# Patient Record
Sex: MALE | Race: WHITE | NOT HISPANIC OR LATINO | Employment: OTHER | URBAN - METROPOLITAN AREA
[De-identification: names, ages, dates, MRNs, and addresses within clinical notes are randomized per-mention and may not be internally consistent; named-entity substitution may affect disease eponyms.]

---

## 2017-02-16 PROBLEM — N21.0 BLADDER STONE: Status: ACTIVE | Noted: 2017-02-16

## 2017-02-17 PROBLEM — R31.0 GROSS HEMATURIA: Status: ACTIVE | Noted: 2017-02-17

## 2019-02-09 ENCOUNTER — HOSPITAL ENCOUNTER (EMERGENCY)
Facility: HOSPITAL | Age: 84
Discharge: HOME | End: 2019-02-09
Attending: SURGERY
Payer: MEDICARE

## 2019-02-09 VITALS
OXYGEN SATURATION: 100 % | BODY MASS INDEX: 31.32 KG/M2 | HEART RATE: 95 BPM | DIASTOLIC BLOOD PRESSURE: 70 MMHG | RESPIRATION RATE: 17 BRPM | TEMPERATURE: 98 F | SYSTOLIC BLOOD PRESSURE: 130 MMHG | WEIGHT: 200 LBS

## 2019-02-09 DIAGNOSIS — R06.02 SOB (SHORTNESS OF BREATH): ICD-10-CM

## 2019-02-09 DIAGNOSIS — J10.1 INFLUENZA A: Primary | ICD-10-CM

## 2019-02-09 LAB
ALBUMIN SERPL BCP-MCNC: 3.6 G/DL
ALP SERPL-CCNC: 83 U/L
ALT SERPL W/O P-5'-P-CCNC: 13 U/L
ANION GAP SERPL CALC-SCNC: 8 MMOL/L
APTT BLDCRRT: 34.2 SEC
AST SERPL-CCNC: 21 U/L
BASOPHILS # BLD AUTO: 0.01 K/UL
BASOPHILS NFR BLD: 0.2 %
BILIRUB SERPL-MCNC: 0.4 MG/DL
BNP SERPL-MCNC: 36 PG/ML
BUN SERPL-MCNC: 32 MG/DL
CALCIUM SERPL-MCNC: 9.1 MG/DL
CHLORIDE SERPL-SCNC: 105 MMOL/L
CK MB SERPL-MCNC: 3.2 NG/ML
CK MB SERPL-RTO: 1.8 %
CK SERPL-CCNC: 175 U/L
CK SERPL-CCNC: 175 U/L
CO2 SERPL-SCNC: 26 MMOL/L
CREAT SERPL-MCNC: 1.7 MG/DL
D DIMER PPP IA.FEU-MCNC: 4.22 MG/L FEU
DEPRECATED S PYO AG THROAT QL EIA: NEGATIVE
DIFFERENTIAL METHOD: ABNORMAL
EOSINOPHIL # BLD AUTO: 0.1 K/UL
EOSINOPHIL NFR BLD: 2.4 %
ERYTHROCYTE [DISTWIDTH] IN BLOOD BY AUTOMATED COUNT: 14.9 %
EST. GFR  (AFRICAN AMERICAN): 41 ML/MIN/1.73 M^2
EST. GFR  (NON AFRICAN AMERICAN): 35 ML/MIN/1.73 M^2
GLUCOSE SERPL-MCNC: 116 MG/DL
HCT VFR BLD AUTO: 43.9 %
HGB BLD-MCNC: 14.4 G/DL
INFLUENZA A, MOLECULAR: POSITIVE
INFLUENZA B, MOLECULAR: NEGATIVE
INR PPP: 1
LYMPHOCYTES # BLD AUTO: 0.6 K/UL
LYMPHOCYTES NFR BLD: 11.3 %
MCH RBC QN AUTO: 31.4 PG
MCHC RBC AUTO-ENTMCNC: 32.8 G/DL
MCV RBC AUTO: 96 FL
MONOCYTES # BLD AUTO: 0.5 K/UL
MONOCYTES NFR BLD: 8.9 %
NEUTROPHILS # BLD AUTO: 4.3 K/UL
NEUTROPHILS NFR BLD: 77.2 %
PLATELET # BLD AUTO: 177 K/UL
PMV BLD AUTO: 9.5 FL
POTASSIUM SERPL-SCNC: 4.3 MMOL/L
PROT SERPL-MCNC: 7.1 G/DL
PROTHROMBIN TIME: 10.9 SEC
RBC # BLD AUTO: 4.59 M/UL
SODIUM SERPL-SCNC: 139 MMOL/L
SPECIMEN SOURCE: ABNORMAL
TROPONIN I SERPL DL<=0.01 NG/ML-MCNC: 0.02 NG/ML
WBC # BLD AUTO: 5.5 K/UL

## 2019-02-09 PROCEDURE — 84484 ASSAY OF TROPONIN QUANT: CPT

## 2019-02-09 PROCEDURE — 93005 ELECTROCARDIOGRAM TRACING: CPT

## 2019-02-09 PROCEDURE — 87880 STREP A ASSAY W/OPTIC: CPT

## 2019-02-09 PROCEDURE — 99285 EMERGENCY DEPT VISIT HI MDM: CPT

## 2019-02-09 PROCEDURE — 87081 CULTURE SCREEN ONLY: CPT

## 2019-02-09 PROCEDURE — 85025 COMPLETE CBC W/AUTO DIFF WBC: CPT

## 2019-02-09 PROCEDURE — 85730 THROMBOPLASTIN TIME PARTIAL: CPT

## 2019-02-09 PROCEDURE — 36415 COLL VENOUS BLD VENIPUNCTURE: CPT

## 2019-02-09 PROCEDURE — 93010 ELECTROCARDIOGRAM REPORT: CPT | Mod: ,,, | Performed by: INTERNAL MEDICINE

## 2019-02-09 PROCEDURE — 82550 ASSAY OF CK (CPK): CPT

## 2019-02-09 PROCEDURE — 85379 FIBRIN DEGRADATION QUANT: CPT

## 2019-02-09 PROCEDURE — 87502 INFLUENZA DNA AMP PROBE: CPT

## 2019-02-09 PROCEDURE — 25000003 PHARM REV CODE 250: Performed by: SURGERY

## 2019-02-09 PROCEDURE — 82553 CREATINE MB FRACTION: CPT

## 2019-02-09 PROCEDURE — 80053 COMPREHEN METABOLIC PANEL: CPT

## 2019-02-09 PROCEDURE — 93010 EKG 12-LEAD: ICD-10-PCS | Mod: ,,, | Performed by: INTERNAL MEDICINE

## 2019-02-09 PROCEDURE — 83880 ASSAY OF NATRIURETIC PEPTIDE: CPT

## 2019-02-09 PROCEDURE — 85610 PROTHROMBIN TIME: CPT

## 2019-02-09 RX ORDER — OSELTAMIVIR PHOSPHATE 75 MG/1
75 CAPSULE ORAL
Status: COMPLETED | OUTPATIENT
Start: 2019-02-09 | End: 2019-02-09

## 2019-02-09 RX ORDER — ALBUTEROL SULFATE 2 MG/5ML
2 SYRUP ORAL 3 TIMES DAILY PRN
Qty: 100 ML | Refills: 0 | Status: ON HOLD | OUTPATIENT
Start: 2019-02-09 | End: 2020-08-28 | Stop reason: SDUPTHER

## 2019-02-09 RX ORDER — OSELTAMIVIR PHOSPHATE 75 MG/1
75 CAPSULE ORAL 2 TIMES DAILY
Qty: 10 CAPSULE | Refills: 0 | Status: SHIPPED | OUTPATIENT
Start: 2019-02-09 | End: 2019-02-14

## 2019-02-09 RX ADMIN — OSELTAMIVIR PHOSPHATE 75 MG: 75 CAPSULE ORAL at 07:02

## 2019-02-10 NOTE — ED PROVIDER NOTES
[facility]                                                 Chief Complaint  88 y.o. male with Cough    History of Present Illness  Pinky Rivera presents to the emergency room with nasal congestion today  Patient on exam has clear nasal drainage with nasal mucosa erythema noted now  Patient's wife was diagnosed with influenza a, patient has no fever on ER triage  Patient on exam has clear lung sounds in all fields, no active wheezing noted now  No nausea vomiting or diarrhea, patient is not toxic or dehydrated on ER evaluation    The history is provided by the patient   device was not used during this ER visit    Past Medical History   -- Bladder stone    -- Blind right eye    -- BPH (benign prostatic hypertrophy)    -- Carotid artery disease    -- CHF (congestive heart failure)    -- [place] (hard of hearing)    -- Hyperlipidemia    -- Hypertension    -- Incomplete bundle branch block    -- Osteoarthritis    -- Pulmonary heart disease, chronic      Surgeries:  Appendectomy, cholecystectomy, colonoscopy, cystoscopy, lithotripsy, tonsillectomy  No Known Allergies     Review of Systems and Physical Exam      Review of Systems  -- Constitution - fever, denies fatigue, no weakness, no chills  -- Eyes - no tearing or redness, no visual disturbance  -- Ear, Nose - sneezing, nasal congestion and clear discharge   -- Mouth,Throat - no sore throat, no toothache, normal voice, normal swallowing  -- Respiratory - cough and congestion, no shortness of breath, no QUIROZ  -- Cardiovascular - denies chest pain, no palpitations, denies claudication  -- Gastrointestinal - denies abdominal pain, nausea, vomiting, or diarrhea  -- Genitourinary - no dysuria, no hematuria, no flank pain, no bladder pain  -- Musculoskeletal - denies back pain, negative for trauma or injury  -- Neurological - no headache, denies weakness or seizure; no LOC  -- Skin - denies pallor, rash, or changes in skin. no hives  or welts noted    Vital Signs  His oral temperature is 98.4 °F (36.9 °C).   His blood pressure is 132/70 and his pulse is 98.   His respiration is 20 and oxygen saturation is 94 (abnormal)%.     Physical Exam  -- Nursing note and vitals reviewed  -- Constitutional: Appears well-developed and well-nourished  -- Head: Atraumatic. Normocephalic. No obvious abnormality  -- Eyes: Pupils are equal and reactive to light. Normal conjunctiva and lids  -- Nose: nasal mucosa erythema and edema; clear nasal discharge noted   -- Throat: Mucous membranes moist, pharynx normal, normal tonsils. No lesions   -- Ears: External ears and TM normal bilaterally. Normal hearing and no drainage  -- Neck: Normal range of motion. Neck supple. No masses, trachea midline  -- Cardiac: Normal rate, regular rhythm and normal heart sounds  -- Pulmonary: Normal respiratory effort, breath sounds clear to auscultation  -- Abdominal: Soft, no tenderness. Normal bowel sounds. Normal liver edge  -- Musculoskeletal: Normal range of motion, no effusions. Joints stable   -- Neurological: No focal deficits. Showed good interaction with staff  -- Skin: Warm and dry. No evidence of rash or cellulitis    Emergency Room Course      Lab Results     K 4.3      CO2 26   BUN 32 (H)   CREATININE 1.7 (H)    (H)   ALKPHOS 83   AST 21   ALT 13   BILITOT 0.4   ALBUMIN 3.6   PROT 7.1   WBC 5.50   HGB 14.4   HCT 43.9            CPKMB 3.2   TROPONINI 0.018   INR 1.0   BNP 36   DDIMER 4.22 (H)     EKG  -- The EKG findings today were without concerning findings from baseline    Radiology  -- Chest x-ray showed no infiltrate and showed no acute pathology  -- The US of the lower extremity performed in the ER today was negative for DVT      Medications Given  oseltamivir capsule 75 mg (75 mg Oral Given 2/9/19 1930)     Diagnosis  -- The primary encounter diagnosis was Influenza A.   -- A diagnosis of SOB (shortness of breath) was also  pertinent to this visit.    Disposition and Plan  -- Disposition: home  -- Condition: stable  -- Follow-up: Patient to follow up with Luly Ennis NP in 1-2 days.  -- I advised the patient that we have found no life threatening condition today  -- At this time, I believe the patient is clinically stable for discharge.   -- The patient acknowledges that close follow up with a MD is required   -- Patient agrees to comply with all instruction and direction given in the ER    This note is dictated on M*Modal word recognition program.  There are word recognition mistakes that are occasionally missed on review.          Burke Jimenez MD  02/09/19 [id number]

## 2019-02-12 LAB — BACTERIA THROAT CULT: NORMAL

## 2020-08-21 ENCOUNTER — HOSPITAL ENCOUNTER (EMERGENCY)
Facility: HOSPITAL | Age: 85
Discharge: ANOTHER HOSPITAL | End: 2020-08-22
Attending: SURGERY
Payer: MEDICARE

## 2020-08-21 DIAGNOSIS — J18.9 PNEUMONIA DUE TO INFECTIOUS ORGANISM, UNSPECIFIED LATERALITY, UNSPECIFIED PART OF LUNG: ICD-10-CM

## 2020-08-21 DIAGNOSIS — U07.1 COVID-19 VIRUS INFECTION: Primary | ICD-10-CM

## 2020-08-21 DIAGNOSIS — J96.90 RESPIRATORY FAILURE, UNSPECIFIED CHRONICITY, UNSPECIFIED WHETHER WITH HYPOXIA OR HYPERCAPNIA: ICD-10-CM

## 2020-08-21 DIAGNOSIS — U07.1 COVID-19 VIRUS DETECTED: ICD-10-CM

## 2020-08-21 DIAGNOSIS — R05.9 COUGH: ICD-10-CM

## 2020-08-21 DIAGNOSIS — R09.02 HYPOXIA: ICD-10-CM

## 2020-08-21 DIAGNOSIS — R06.00 DYSPNEA, UNSPECIFIED TYPE: ICD-10-CM

## 2020-08-21 LAB
ALBUMIN SERPL BCP-MCNC: 3.3 G/DL (ref 3.5–5.2)
ALLENS TEST: ABNORMAL
ALP SERPL-CCNC: 53 U/L (ref 55–135)
ALT SERPL W/O P-5'-P-CCNC: 13 U/L (ref 10–44)
AMORPH CRY URNS QL MICRO: ABNORMAL
ANION GAP SERPL CALC-SCNC: 9 MMOL/L (ref 8–16)
APTT BLDCRRT: 33.2 SEC (ref 21–32)
AST SERPL-CCNC: 30 U/L (ref 10–40)
BACTERIA #/AREA URNS HPF: ABNORMAL /HPF
BASOPHILS # BLD AUTO: 0 K/UL (ref 0–0.2)
BASOPHILS NFR BLD: 0 % (ref 0–1.9)
BILIRUB SERPL-MCNC: 0.5 MG/DL (ref 0.1–1)
BILIRUB UR QL STRIP: NEGATIVE
BNP SERPL-MCNC: 46 PG/ML (ref 0–99)
BUN SERPL-MCNC: 28 MG/DL (ref 8–23)
CALCIUM SERPL-MCNC: 8.8 MG/DL (ref 8.7–10.5)
CHLORIDE SERPL-SCNC: 104 MMOL/L (ref 95–110)
CK MB SERPL-MCNC: 3.1 NG/ML (ref 0.1–6.5)
CK MB SERPL-RTO: 0.9 % (ref 0–5)
CK SERPL-CCNC: 350 U/L (ref 20–200)
CK SERPL-CCNC: 350 U/L (ref 20–200)
CLARITY UR: ABNORMAL
CO2 SERPL-SCNC: 28 MMOL/L (ref 23–29)
COLOR UR: YELLOW
CREAT SERPL-MCNC: 1.5 MG/DL (ref 0.5–1.4)
D DIMER PPP IA.FEU-MCNC: 4.82 MG/L FEU
DELSYS: ABNORMAL
DIFFERENTIAL METHOD: ABNORMAL
EOSINOPHIL # BLD AUTO: 0 K/UL (ref 0–0.5)
EOSINOPHIL NFR BLD: 0.2 % (ref 0–8)
ERYTHROCYTE [DISTWIDTH] IN BLOOD BY AUTOMATED COUNT: 14.2 % (ref 11.5–14.5)
ERYTHROCYTE [SEDIMENTATION RATE] IN BLOOD BY WESTERGREN METHOD: 64 MM/HR (ref 0–10)
EST. GFR  (AFRICAN AMERICAN): 47 ML/MIN/1.73 M^2
EST. GFR  (NON AFRICAN AMERICAN): 40 ML/MIN/1.73 M^2
GLUCOSE SERPL-MCNC: 105 MG/DL (ref 70–110)
GLUCOSE UR QL STRIP: NEGATIVE
GROUP A STREP, MOLECULAR: NEGATIVE
HCO3 UR-SCNC: 24.7 MMOL/L (ref 22–26)
HCT VFR BLD AUTO: 43.6 % (ref 40–54)
HGB BLD-MCNC: 14 G/DL (ref 14–18)
HGB UR QL STRIP: ABNORMAL
HYALINE CASTS #/AREA URNS LPF: 0 /LPF
IMM GRANULOCYTES # BLD AUTO: 0.01 K/UL (ref 0–0.04)
IMM GRANULOCYTES NFR BLD AUTO: 0.2 % (ref 0–0.5)
INFLUENZA A, MOLECULAR: NEGATIVE
INFLUENZA B, MOLECULAR: NEGATIVE
INR PPP: 1 (ref 0.8–1.2)
KETONES UR QL STRIP: NEGATIVE
LACTATE SERPL-SCNC: 1.2 MMOL/L (ref 0.5–2.2)
LDH SERPL L TO P-CCNC: 277 U/L (ref 110–260)
LEUKOCYTE ESTERASE UR QL STRIP: NEGATIVE
LYMPHOCYTES # BLD AUTO: 0.9 K/UL (ref 1–4.8)
LYMPHOCYTES NFR BLD: 18.7 % (ref 18–48)
MCH RBC QN AUTO: 31.4 PG (ref 27–31)
MCHC RBC AUTO-ENTMCNC: 32.1 G/DL (ref 32–36)
MCV RBC AUTO: 98 FL (ref 82–98)
MICROSCOPIC COMMENT: ABNORMAL
MONOCYTES # BLD AUTO: 0.4 K/UL (ref 0.3–1)
MONOCYTES NFR BLD: 8.6 % (ref 4–15)
NEUTROPHILS # BLD AUTO: 3.6 K/UL (ref 1.8–7.7)
NEUTROPHILS NFR BLD: 72.3 % (ref 38–73)
NITRITE UR QL STRIP: NEGATIVE
NRBC BLD-RTO: 0 /100 WBC
PCO2 BLDA: 39 MMHG (ref 35–45)
PH SMN: 7.41 [PH] (ref 7.35–7.45)
PH UR STRIP: 5 [PH] (ref 5–8)
PLATELET # BLD AUTO: 189 K/UL (ref 150–350)
PMV BLD AUTO: 9.3 FL (ref 9.2–12.9)
PO2 BLDA: 46 MMHG (ref 75–100)
POC BE: 0.1 MMOL/L (ref -2–2)
POC COHB: 1.6 % (ref 0–3)
POC METHB: 0.7 % (ref 0–1.5)
POC O2HB ARTERIAL: 84.7 % (ref 94–100)
POC SATURATED O2: 86.7 % (ref 90–100)
POC TCO2: 25.9 MMOL/L
POC THB: 12.6 G/DL (ref 12–18)
POTASSIUM SERPL-SCNC: 4.9 MMOL/L (ref 3.5–5.1)
PROCALCITONIN SERPL IA-MCNC: 0.17 NG/ML
PROT SERPL-MCNC: 7.3 G/DL (ref 6–8.4)
PROT UR QL STRIP: ABNORMAL
PROTHROMBIN TIME: 10.7 SEC (ref 9–12.5)
RBC # BLD AUTO: 4.46 M/UL (ref 4.6–6.2)
RBC #/AREA URNS HPF: 2 /HPF (ref 0–4)
SARS-COV-2 RDRP RESP QL NAA+PROBE: POSITIVE
SITE: ABNORMAL
SODIUM SERPL-SCNC: 141 MMOL/L (ref 136–145)
SP GR UR STRIP: 1.02 (ref 1–1.03)
SPECIMEN SOURCE: NORMAL
TROPONIN I SERPL DL<=0.01 NG/ML-MCNC: 0.09 NG/ML (ref 0–0.03)
URN SPEC COLLECT METH UR: ABNORMAL
UROBILINOGEN UR STRIP-ACNC: NEGATIVE EU/DL
WBC # BLD AUTO: 4.98 K/UL (ref 3.9–12.7)
WBC #/AREA URNS HPF: 1 /HPF (ref 0–5)

## 2020-08-21 PROCEDURE — 85610 PROTHROMBIN TIME: CPT

## 2020-08-21 PROCEDURE — 82553 CREATINE MB FRACTION: CPT

## 2020-08-21 PROCEDURE — 85730 THROMBOPLASTIN TIME PARTIAL: CPT

## 2020-08-21 PROCEDURE — 81000 URINALYSIS NONAUTO W/SCOPE: CPT

## 2020-08-21 PROCEDURE — 84145 PROCALCITONIN (PCT): CPT

## 2020-08-21 PROCEDURE — 87651 STREP A DNA AMP PROBE: CPT

## 2020-08-21 PROCEDURE — 83880 ASSAY OF NATRIURETIC PEPTIDE: CPT

## 2020-08-21 PROCEDURE — 87040 BLOOD CULTURE FOR BACTERIA: CPT | Mod: 59

## 2020-08-21 PROCEDURE — 85025 COMPLETE CBC W/AUTO DIFF WBC: CPT

## 2020-08-21 PROCEDURE — U0002 COVID-19 LAB TEST NON-CDC: HCPCS

## 2020-08-21 PROCEDURE — 25000003 PHARM REV CODE 250: Performed by: SURGERY

## 2020-08-21 PROCEDURE — 96365 THER/PROPH/DIAG IV INF INIT: CPT

## 2020-08-21 PROCEDURE — 87502 INFLUENZA DNA AMP PROBE: CPT

## 2020-08-21 PROCEDURE — 82550 ASSAY OF CK (CPK): CPT

## 2020-08-21 PROCEDURE — 85651 RBC SED RATE NONAUTOMATED: CPT

## 2020-08-21 PROCEDURE — 86140 C-REACTIVE PROTEIN: CPT

## 2020-08-21 PROCEDURE — 82803 BLOOD GASES ANY COMBINATION: CPT | Performed by: SURGERY

## 2020-08-21 PROCEDURE — 96367 TX/PROPH/DG ADDL SEQ IV INF: CPT

## 2020-08-21 PROCEDURE — 99285 EMERGENCY DEPT VISIT HI MDM: CPT | Mod: 25

## 2020-08-21 PROCEDURE — 63600175 PHARM REV CODE 636 W HCPCS: Performed by: SURGERY

## 2020-08-21 PROCEDURE — 93005 ELECTROCARDIOGRAM TRACING: CPT

## 2020-08-21 PROCEDURE — 80053 COMPREHEN METABOLIC PANEL: CPT

## 2020-08-21 PROCEDURE — 83615 LACTATE (LD) (LDH) ENZYME: CPT

## 2020-08-21 PROCEDURE — 36600 WITHDRAWAL OF ARTERIAL BLOOD: CPT

## 2020-08-21 PROCEDURE — 85379 FIBRIN DEGRADATION QUANT: CPT

## 2020-08-21 PROCEDURE — 25500020 PHARM REV CODE 255: Performed by: SURGERY

## 2020-08-21 PROCEDURE — 82728 ASSAY OF FERRITIN: CPT

## 2020-08-21 PROCEDURE — 93010 ELECTROCARDIOGRAM REPORT: CPT | Mod: ,,, | Performed by: INTERNAL MEDICINE

## 2020-08-21 PROCEDURE — 83605 ASSAY OF LACTIC ACID: CPT

## 2020-08-21 PROCEDURE — 96361 HYDRATE IV INFUSION ADD-ON: CPT

## 2020-08-21 PROCEDURE — 84484 ASSAY OF TROPONIN QUANT: CPT

## 2020-08-21 PROCEDURE — 96375 TX/PRO/DX INJ NEW DRUG ADDON: CPT | Mod: 59

## 2020-08-21 PROCEDURE — 36415 COLL VENOUS BLD VENIPUNCTURE: CPT

## 2020-08-21 PROCEDURE — 93010 EKG 12-LEAD: ICD-10-PCS | Mod: ,,, | Performed by: INTERNAL MEDICINE

## 2020-08-21 RX ORDER — DEXAMETHASONE SODIUM PHOSPHATE 4 MG/ML
6 INJECTION, SOLUTION INTRA-ARTICULAR; INTRALESIONAL; INTRAMUSCULAR; INTRAVENOUS; SOFT TISSUE
Status: COMPLETED | OUTPATIENT
Start: 2020-08-21 | End: 2020-08-21

## 2020-08-21 RX ADMIN — SODIUM CHLORIDE 500 ML: 0.9 INJECTION, SOLUTION INTRAVENOUS at 09:08

## 2020-08-21 RX ADMIN — AZITHROMYCIN 500 MG: 500 INJECTION, POWDER, LYOPHILIZED, FOR SOLUTION INTRAVENOUS at 10:08

## 2020-08-21 RX ADMIN — IOHEXOL 100 ML: 350 INJECTION, SOLUTION INTRAVENOUS at 10:08

## 2020-08-21 RX ADMIN — CEFTRIAXONE 1 G: 1 INJECTION, SOLUTION INTRAVENOUS at 11:08

## 2020-08-21 RX ADMIN — DEXAMETHASONE SODIUM PHOSPHATE 6 MG: 4 INJECTION, SOLUTION INTRA-ARTICULAR; INTRALESIONAL; INTRAMUSCULAR; INTRAVENOUS; SOFT TISSUE at 11:08

## 2020-08-21 NOTE — ED TRIAGE NOTES
Pt reports he has cough for a few days now. Is concerned for fluid build up on lungs or covid infection.

## 2020-08-22 ENCOUNTER — HOSPITAL ENCOUNTER (INPATIENT)
Facility: HOSPITAL | Age: 85
LOS: 6 days | Discharge: HOME HEALTH | DRG: 177 | End: 2020-08-28
Attending: HOSPITALIST | Admitting: HOSPITALIST
Payer: MEDICARE

## 2020-08-22 VITALS
HEART RATE: 88 BPM | SYSTOLIC BLOOD PRESSURE: 144 MMHG | DIASTOLIC BLOOD PRESSURE: 65 MMHG | TEMPERATURE: 100 F | OXYGEN SATURATION: 96 % | RESPIRATION RATE: 35 BRPM

## 2020-08-22 DIAGNOSIS — U07.1 COVID-19: ICD-10-CM

## 2020-08-22 DIAGNOSIS — U07.1 PNEUMONIA DUE TO COVID-19 VIRUS: ICD-10-CM

## 2020-08-22 DIAGNOSIS — R00.1 BRADYCARDIA: ICD-10-CM

## 2020-08-22 DIAGNOSIS — J12.82 PNEUMONIA DUE TO COVID-19 VIRUS: ICD-10-CM

## 2020-08-22 DIAGNOSIS — J96.01 ACUTE HYPOXEMIC RESPIRATORY FAILURE: ICD-10-CM

## 2020-08-22 PROBLEM — R79.89 ELEVATED TROPONIN: Status: ACTIVE | Noted: 2020-08-22

## 2020-08-22 PROBLEM — H54.40 BLINDNESS OF RIGHT EYE: Chronic | Status: ACTIVE | Noted: 2020-08-22

## 2020-08-22 PROBLEM — N40.0 BPH (BENIGN PROSTATIC HYPERPLASIA): Status: ACTIVE | Noted: 2020-08-22

## 2020-08-22 PROBLEM — N18.30 CKD (CHRONIC KIDNEY DISEASE), STAGE III: Status: ACTIVE | Noted: 2020-08-22

## 2020-08-22 LAB
25(OH)D3+25(OH)D2 SERPL-MCNC: 33 NG/ML (ref 30–96)
ALBUMIN SERPL BCP-MCNC: 2.8 G/DL (ref 3.5–5.2)
ALP SERPL-CCNC: 50 U/L (ref 55–135)
ALT SERPL W/O P-5'-P-CCNC: 13 U/L (ref 10–44)
ANION GAP SERPL CALC-SCNC: 9 MMOL/L (ref 8–16)
AST SERPL-CCNC: 30 U/L (ref 10–40)
BASOPHILS # BLD AUTO: 0 K/UL (ref 0–0.2)
BASOPHILS NFR BLD: 0 % (ref 0–1.9)
BILIRUB SERPL-MCNC: 0.4 MG/DL (ref 0.1–1)
BUN SERPL-MCNC: 23 MG/DL (ref 8–23)
CALCIUM SERPL-MCNC: 8.4 MG/DL (ref 8.7–10.5)
CHLORIDE SERPL-SCNC: 106 MMOL/L (ref 95–110)
CK SERPL-CCNC: 273 U/L (ref 20–200)
CO2 SERPL-SCNC: 24 MMOL/L (ref 23–29)
CREAT SERPL-MCNC: 1.2 MG/DL (ref 0.5–1.4)
CRP SERPL-MCNC: 83.5 MG/L (ref 0–8.2)
DIFFERENTIAL METHOD: ABNORMAL
EOSINOPHIL # BLD AUTO: 0 K/UL (ref 0–0.5)
EOSINOPHIL NFR BLD: 0 % (ref 0–8)
ERYTHROCYTE [DISTWIDTH] IN BLOOD BY AUTOMATED COUNT: 14.1 % (ref 11.5–14.5)
ERYTHROCYTE [SEDIMENTATION RATE] IN BLOOD BY WESTERGREN METHOD: 82 MM/HR (ref 0–23)
EST. GFR  (AFRICAN AMERICAN): >60 ML/MIN/1.73 M^2
EST. GFR  (NON AFRICAN AMERICAN): 52.9 ML/MIN/1.73 M^2
FERRITIN SERPL-MCNC: 455 NG/ML (ref 20–300)
GLUCOSE SERPL-MCNC: 136 MG/DL (ref 70–110)
HCT VFR BLD AUTO: 40.7 % (ref 40–54)
HGB BLD-MCNC: 12.9 G/DL (ref 14–18)
IMM GRANULOCYTES # BLD AUTO: 0.01 K/UL (ref 0–0.04)
IMM GRANULOCYTES NFR BLD AUTO: 0.2 % (ref 0–0.5)
LYMPHOCYTES # BLD AUTO: 0.4 K/UL (ref 1–4.8)
LYMPHOCYTES NFR BLD: 9.7 % (ref 18–48)
MAGNESIUM SERPL-MCNC: 2 MG/DL (ref 1.6–2.6)
MCH RBC QN AUTO: 31.3 PG (ref 27–31)
MCHC RBC AUTO-ENTMCNC: 31.7 G/DL (ref 32–36)
MCV RBC AUTO: 99 FL (ref 82–98)
MONOCYTES # BLD AUTO: 0.2 K/UL (ref 0.3–1)
MONOCYTES NFR BLD: 4.6 % (ref 4–15)
NEUTROPHILS # BLD AUTO: 3.5 K/UL (ref 1.8–7.7)
NEUTROPHILS NFR BLD: 85.5 % (ref 38–73)
NRBC BLD-RTO: 0 /100 WBC
PHOSPHATE SERPL-MCNC: 3.4 MG/DL (ref 2.7–4.5)
PLATELET # BLD AUTO: 169 K/UL (ref 150–350)
PMV BLD AUTO: 9.8 FL (ref 9.2–12.9)
POTASSIUM SERPL-SCNC: 4.5 MMOL/L (ref 3.5–5.1)
PROCALCITONIN SERPL IA-MCNC: 0.14 NG/ML
PROT SERPL-MCNC: 6.5 G/DL (ref 6–8.4)
RBC # BLD AUTO: 4.12 M/UL (ref 4.6–6.2)
SODIUM SERPL-SCNC: 139 MMOL/L (ref 136–145)
TROPONIN I SERPL DL<=0.01 NG/ML-MCNC: 0.07 NG/ML (ref 0–0.03)
WBC # BLD AUTO: 4.13 K/UL (ref 3.9–12.7)

## 2020-08-22 PROCEDURE — 99900035 HC TECH TIME PER 15 MIN (STAT)

## 2020-08-22 PROCEDURE — 25000003 PHARM REV CODE 250: Performed by: HOSPITALIST

## 2020-08-22 PROCEDURE — 82306 VITAMIN D 25 HYDROXY: CPT

## 2020-08-22 PROCEDURE — 63600175 PHARM REV CODE 636 W HCPCS: Performed by: HOSPITALIST

## 2020-08-22 PROCEDURE — 99223 PR INITIAL HOSPITAL CARE,LEVL III: ICD-10-PCS | Mod: ,,, | Performed by: HOSPITALIST

## 2020-08-22 PROCEDURE — 94640 AIRWAY INHALATION TREATMENT: CPT

## 2020-08-22 PROCEDURE — 85652 RBC SED RATE AUTOMATED: CPT

## 2020-08-22 PROCEDURE — 80053 COMPREHEN METABOLIC PANEL: CPT

## 2020-08-22 PROCEDURE — 84100 ASSAY OF PHOSPHORUS: CPT

## 2020-08-22 PROCEDURE — A4216 STERILE WATER/SALINE, 10 ML: HCPCS | Performed by: HOSPITALIST

## 2020-08-22 PROCEDURE — 63700000 PHARM REV CODE 250 ALT 637 W/O HCPCS: Performed by: HOSPITALIST

## 2020-08-22 PROCEDURE — 11000001 HC ACUTE MED/SURG PRIVATE ROOM

## 2020-08-22 PROCEDURE — 84484 ASSAY OF TROPONIN QUANT: CPT

## 2020-08-22 PROCEDURE — 27000221 HC OXYGEN, UP TO 24 HOURS

## 2020-08-22 PROCEDURE — 82550 ASSAY OF CK (CPK): CPT

## 2020-08-22 PROCEDURE — 94761 N-INVAS EAR/PLS OXIMETRY MLT: CPT

## 2020-08-22 PROCEDURE — 36415 COLL VENOUS BLD VENIPUNCTURE: CPT

## 2020-08-22 PROCEDURE — 85025 COMPLETE CBC W/AUTO DIFF WBC: CPT

## 2020-08-22 PROCEDURE — 99223 1ST HOSP IP/OBS HIGH 75: CPT | Mod: ,,, | Performed by: HOSPITALIST

## 2020-08-22 PROCEDURE — 84145 PROCALCITONIN (PCT): CPT

## 2020-08-22 PROCEDURE — 83735 ASSAY OF MAGNESIUM: CPT

## 2020-08-22 PROCEDURE — 25000242 PHARM REV CODE 250 ALT 637 W/ HCPCS: Performed by: HOSPITALIST

## 2020-08-22 RX ORDER — IBUPROFEN 200 MG
16 TABLET ORAL
Status: DISCONTINUED | OUTPATIENT
Start: 2020-08-22 | End: 2020-08-28 | Stop reason: HOSPADM

## 2020-08-22 RX ORDER — IPRATROPIUM BROMIDE AND ALBUTEROL SULFATE 2.5; .5 MG/3ML; MG/3ML
3 SOLUTION RESPIRATORY (INHALATION) EVERY 4 HOURS PRN
Status: DISCONTINUED | OUTPATIENT
Start: 2020-08-22 | End: 2020-08-28 | Stop reason: HOSPADM

## 2020-08-22 RX ORDER — ONDANSETRON 2 MG/ML
4 INJECTION INTRAMUSCULAR; INTRAVENOUS EVERY 6 HOURS PRN
Status: DISCONTINUED | OUTPATIENT
Start: 2020-08-22 | End: 2020-08-28 | Stop reason: HOSPADM

## 2020-08-22 RX ORDER — GLUCAGON 1 MG
1 KIT INJECTION
Status: DISCONTINUED | OUTPATIENT
Start: 2020-08-22 | End: 2020-08-28 | Stop reason: HOSPADM

## 2020-08-22 RX ORDER — ACETAMINOPHEN 325 MG/1
650 TABLET ORAL EVERY 4 HOURS PRN
Status: DISCONTINUED | OUTPATIENT
Start: 2020-08-22 | End: 2020-08-28 | Stop reason: HOSPADM

## 2020-08-22 RX ORDER — LOSARTAN POTASSIUM 25 MG/1
50 TABLET ORAL EVERY MORNING
Status: DISCONTINUED | OUTPATIENT
Start: 2020-08-22 | End: 2020-08-26

## 2020-08-22 RX ORDER — CEFTRIAXONE 1 G/1
1 INJECTION, POWDER, FOR SOLUTION INTRAMUSCULAR; INTRAVENOUS
Status: COMPLETED | OUTPATIENT
Start: 2020-08-22 | End: 2020-08-25

## 2020-08-22 RX ORDER — AMOXICILLIN 250 MG
2 CAPSULE ORAL 2 TIMES DAILY PRN
Status: DISCONTINUED | OUTPATIENT
Start: 2020-08-22 | End: 2020-08-28 | Stop reason: HOSPADM

## 2020-08-22 RX ORDER — FINASTERIDE 5 MG/1
5 TABLET, FILM COATED ORAL
Status: DISCONTINUED | OUTPATIENT
Start: 2020-08-22 | End: 2020-08-28 | Stop reason: HOSPADM

## 2020-08-22 RX ORDER — IBUPROFEN 200 MG
24 TABLET ORAL
Status: DISCONTINUED | OUTPATIENT
Start: 2020-08-22 | End: 2020-08-28 | Stop reason: HOSPADM

## 2020-08-22 RX ORDER — ASCORBIC ACID 500 MG
500 TABLET ORAL 2 TIMES DAILY
Status: DISCONTINUED | OUTPATIENT
Start: 2020-08-22 | End: 2020-08-28 | Stop reason: HOSPADM

## 2020-08-22 RX ORDER — ASPIRIN 81 MG/1
81 TABLET ORAL EVERY MORNING
Status: DISCONTINUED | OUTPATIENT
Start: 2020-08-22 | End: 2020-08-28 | Stop reason: HOSPADM

## 2020-08-22 RX ORDER — GUAIFENESIN/DEXTROMETHORPHAN 100-10MG/5
10 SYRUP ORAL EVERY 4 HOURS PRN
Status: DISCONTINUED | OUTPATIENT
Start: 2020-08-22 | End: 2020-08-28 | Stop reason: HOSPADM

## 2020-08-22 RX ORDER — CHOLECALCIFEROL (VITAMIN D3) 25 MCG
1000 TABLET ORAL DAILY
Status: DISCONTINUED | OUTPATIENT
Start: 2020-08-22 | End: 2020-08-28 | Stop reason: HOSPADM

## 2020-08-22 RX ORDER — TAMSULOSIN HYDROCHLORIDE 0.4 MG/1
0.4 CAPSULE ORAL
Status: DISCONTINUED | OUTPATIENT
Start: 2020-08-22 | End: 2020-08-28 | Stop reason: HOSPADM

## 2020-08-22 RX ORDER — ENOXAPARIN SODIUM 100 MG/ML
40 INJECTION SUBCUTANEOUS EVERY 24 HOURS
Status: DISCONTINUED | OUTPATIENT
Start: 2020-08-22 | End: 2020-08-25

## 2020-08-22 RX ORDER — AZITHROMYCIN 250 MG/1
250 TABLET, FILM COATED ORAL DAILY
Status: COMPLETED | OUTPATIENT
Start: 2020-08-22 | End: 2020-08-25

## 2020-08-22 RX ORDER — ALBUTEROL SULFATE 90 UG/1
2 AEROSOL, METERED RESPIRATORY (INHALATION) EVERY 6 HOURS
Status: DISCONTINUED | OUTPATIENT
Start: 2020-08-22 | End: 2020-08-23

## 2020-08-22 RX ORDER — SODIUM CHLORIDE 0.9 % (FLUSH) 0.9 %
10 SYRINGE (ML) INJECTION
Status: DISCONTINUED | OUTPATIENT
Start: 2020-08-22 | End: 2020-08-28 | Stop reason: HOSPADM

## 2020-08-22 RX ORDER — HEPARIN SODIUM 5000 [USP'U]/ML
5000 INJECTION, SOLUTION INTRAVENOUS; SUBCUTANEOUS EVERY 8 HOURS
Status: DISCONTINUED | OUTPATIENT
Start: 2020-08-22 | End: 2020-08-22

## 2020-08-22 RX ADMIN — LOSARTAN POTASSIUM 50 MG: 25 TABLET, FILM COATED ORAL at 08:08

## 2020-08-22 RX ADMIN — HEPARIN SODIUM 5000 UNITS: 5000 INJECTION INTRAVENOUS; SUBCUTANEOUS at 05:08

## 2020-08-22 RX ADMIN — REMDESIVIR 200 MG: 100 INJECTION, POWDER, LYOPHILIZED, FOR SOLUTION INTRAVENOUS at 03:08

## 2020-08-22 RX ADMIN — CEFTRIAXONE SODIUM 1 G: 1 INJECTION, POWDER, FOR SOLUTION INTRAMUSCULAR; INTRAVENOUS at 08:08

## 2020-08-22 RX ADMIN — ASPIRIN 81 MG: 81 TABLET, COATED ORAL at 08:08

## 2020-08-22 RX ADMIN — TAMSULOSIN HYDROCHLORIDE 0.4 MG: 0.4 CAPSULE ORAL at 08:08

## 2020-08-22 RX ADMIN — FINASTERIDE 5 MG: 5 TABLET, FILM COATED ORAL at 08:08

## 2020-08-22 RX ADMIN — Medication 500 MG: at 08:08

## 2020-08-22 RX ADMIN — DOCUSATE SODIUM 50MG AND SENNOSIDES 8.6MG 2 TABLET: 8.6; 5 TABLET, FILM COATED ORAL at 12:08

## 2020-08-22 RX ADMIN — AZITHROMYCIN 250 MG: 250 TABLET, FILM COATED ORAL at 06:08

## 2020-08-22 RX ADMIN — ALBUTEROL SULFATE 2 PUFF: 90 AEROSOL, METERED RESPIRATORY (INHALATION) at 07:08

## 2020-08-22 RX ADMIN — Medication 10 ML: at 08:08

## 2020-08-22 RX ADMIN — ENOXAPARIN SODIUM 40 MG: 40 INJECTION SUBCUTANEOUS at 05:08

## 2020-08-22 RX ADMIN — GUAIFENESIN AND DEXTROMETHORPHAN 10 ML: 100; 10 SYRUP ORAL at 08:08

## 2020-08-22 RX ADMIN — DEXAMETHASONE 6 MG: 4 TABLET ORAL at 08:08

## 2020-08-22 RX ADMIN — CHOLECALCIFEROL (VITAMIN D3) 25 MCG (1,000 UNIT) TABLET 1000 UNITS: at 08:08

## 2020-08-22 NOTE — NURSING
Received pt from [facility] via stretcher to Room 22487 accompanied by [facility] personnel. Moved pt to bed. Monitors connected. O2 on @4L NC per EMS. Will titrate as tolerated. Oriented pt to staff, room, plan of care. Instructed to call for needs and assistance. Urinal and call light  placed within reach. H&P done. Assessment done-see charting. Skin intact; brown discoloration to BLE. R AC 20G PIV site intact; flushes easily. Denies shortness of breath, pain or discomfort. Will monitor.

## 2020-08-22 NOTE — H&P
Hospital Medicine  History and Physical  [facility]      Patient Name: Pinky Rivera  MRN:  [id number]  Hospital Medicine Team: Networked reference to record PCT  Marla Lobato DO  Date of Admission:  8/22/2020     Length of Stay:  LOS: 0 days     Principal Problem: Suspected Covid-19 Virus Infection    Chief complaint: Cough and weakness         HPI      Mr. Rivera is a 89 year old male with PMH of HTN, CKD III, BPH, chronic venous stasis of lower extremities, OA of knees, R eye blindness who presents as a transfer from [facility] ED for management of COVID pneumonia and associated hypoxia. Patient presented to ED last evening for evaluation of non productive cough for 1 month and progressive weakness over last 3-4 days. Family got concerned about his weakness and poor PO intake which prompted them to bring him to hospital. He was hypoxic with O2 sat 86% on room air upon arrival to ED which improved to 96% on 4L supplemental oxygen. He tested positive for COVID along with elevated inflammatory markers and D-dimer. CTA negative for PE but showed bilateral patchy ground glass opacities consistent with viral pneumonia. He received  cc bolus, dexamethasone, empiric dose of CTX and azithromycin prior to transfer. Blood cultures sent.     Patient denies fever, chills, headache, sore throat, chest pain, sob, palpitation, N/V/D, abdominal pain or urinary complaints. Patient states he does not go outside the house and not sure how he contracted the corona virus. He denies recent travel or sick contact. He is hard of hearing but appears comfortable and offers no complaints during my interview.   Review of Systems    Constitutional: Positive for  fatigue, poor appetite   HENT: Negative for sore throat, negative for trouble swallowing.    Eyes: R eye blindness at age 20, Negative for photophobia, visual disturbance.   Respiratory: Positive for cough,  Negative for shortness of  breath  Cardiovascular: Negative for chest pain, palpitations, leg swelling.   Gastrointestinal: Negative for diarrhea. Negative for abdominal pain, constipation, nausea, vomiting.   Endocrine: Negative for cold intolerance, heat intolerance.   Genitourinary: Negative for dysuria, frequency.   Musculoskeletal: Negative for arthralgias, myalgias.   Skin: Negative for rash  Neurological: Negative for dizziness, syncope, light-headedness.   Psychiatric/Behavioral: Negative for confusion, hallucinations, anxiety    Past Medical History:   Diagnosis Date    Bladder stone     Blind right eye     detached retina    BPH (benign prostatic hypertrophy)     Carotid artery disease     CHF (congestive heart failure)     [place] (hard of hearing)     Hyperlipidemia     Hypertension     Incomplete bundle branch block     Osteoarthritis     Pulmonary heart disease, chronic      Past Surgical History:   Procedure Laterality Date    APPENDECTOMY      CHOLECYSTECTOMY      COLONOSCOPY      TONSILLECTOMY       Family History   Problem Relation Age of Onset    Hypertension Mother     Hyperlipidemia Mother     Asthma Mother     Heart disease Father      Social History     Socioeconomic History    Marital status:      Spouse name: Not on file    Number of children: Not on file    Years of education: Not on file    Highest education level: Not on file   Occupational History    Not on file   Social Needs    Financial resource strain: Not on file    Food insecurity     Worry: Not on file     Inability: Not on file    Transportation needs     Medical: Not on file     Non-medical: Not on file   Tobacco Use    Smoking status: Never Smoker    Smokeless tobacco: Never Used   Substance and Sexual Activity    Alcohol use: No    Drug use: No    Sexual activity: Not Currently     Partners: Female   Lifestyle    Physical activity     Days per week: Not on file     Minutes per session: Not on file    Stress: Not on  file   Relationships    Social connections     Talks on phone: Not on file     Gets together: Not on file     Attends [employer] service: Not on file     Active member of club or organization: Not on file     Attends meetings of clubs or organizations: Not on file     Relationship status: Not on file   Other Topics Concern    Not on file   Social History Narrative    Not on file       Medications  Current Facility-Administered Medications on File Prior to Encounter   Medication Dose Route Frequency Provider Last Rate Last Dose    [COMPLETED] azithromycin 500 mg in dextrose 5 % 250 mL IVPB (ready to mix system)  500 mg Intravenous ED 1 Time Burke Jimenez MD   Stopped at 08/21/20 2347    [COMPLETED] cefTRIAXone (ROCEPHIN) 1 g/50 mL D5W IVPB  1 g Intravenous ED 1 Time Burke Jimenez MD   Stopped at 08/22/20 0022    [COMPLETED] dexamethasone injection 6 mg  6 mg Intravenous ED 1 Time Burke Jimenez MD   6 mg at 08/21/20 2350    [COMPLETED] iohexoL (OMNIPAQUE 350) injection 100 mL  100 mL Intravenous ONCE PRN Burke Jimenez MD   100 mL at 08/21/20 2219    [COMPLETED] sodium chloride 0.9% bolus 500 mL  500 mL Intravenous ED 1 Time Burke Jimenez MD   Stopped at 08/21/20 2223     Current Outpatient Medications on File Prior to Encounter   Medication Sig Dispense Refill    albuterol 2 mg/5 mL syrup Take 5 mLs (2 mg total) by mouth 3 (three) times daily as needed (COUGH/BRONCHOSPASM). 100 mL 0    aspirin (ECOTRIN) 81 MG EC tablet Take 81 mg by mouth every morning.       finasteride (PROSCAR) 5 mg tablet Take 5 mg by mouth daily 2 hours after breakfast.      furosemide (LASIX) 20 MG tablet Take 20 mg by mouth every morning.       losartan (COZAAR) 12.5 MG tablet Take 50 mg by mouth every morning.       naproxen sodium (ANAPROX) 220 MG tablet Take 1 tablet (220 mg total) by mouth as needed (hematuria).      TAMSULOSIN HCL (FLOMAX ORAL) Take 0.4 mg by mouth daily 2 hours after breakfast.      vitamin D  1000 units Tab Take 1,000 Units by mouth nightly.       vitamin D 1000 units Tab Take 1 tablet (1,000 Units total) by mouth nightly.         Allergies  Patient has no known allergies.    Physical Examination  Temp:  [98 °F (36.7 °C)-100.2 °F (37.9 °C)]   Pulse:  []   Resp:  [20-35]   BP: (123-174)/(64-99)   SpO2:  [86 %-99 %]     Gen: NAD, conversant  Head: NC, AT  Eyes: Right eye blindness, EOMI  Throat: MMM, OP clear  CV: RRR, no M/R/G, no peripheral edema, no JVD  Resp: coarse bilateral breath sounds, no increased work of breathing on 4 L N/C   GI: Soft, NT, ND, +BS  Ext: MAEW, no c/c/e  Neuro: AAOx3, CN grossly intact, no focal neurologic deficits  Psychiatry: Normal mood, normal affect    Laboratory:  Recent Labs   Lab 08/21/20 1905   WBC 4.98   LYMPH 18.7  0.9*   HGB 14.0   HCT 43.6        Recent Labs   Lab 08/21/20 1905      K 4.9      CO2 28   BUN 28*   CREATININE 1.5*      CALCIUM 8.8     Recent Labs   Lab 08/21/20 1905   ALKPHOS 53*   ALT 13   AST 30   ALBUMIN 3.3*   PROT 7.3   BILITOT 0.5   INR 1.0      Recent Labs     08/21/20 1905   DDIMER 4.82*   FERRITIN 455*   CRP 83.5*   *   BNP 46   TROPONINI 0.093*   LACTATE 1.2       All labs within the last 24 hours were reviewed.     Microbiology:  Lab Results   Component Value Date    [id number] Positive (A) 08/21/2020       Microbiology Results (last 7 days)     ** No results found for the last 168 hours. **            Imaging      No results found for this or any previous visit.    CTA Chest Non-Coronary  Narrative: EXAMINATION:  CTA CHEST NON CORONARY    CLINICAL HISTORY:  Cough;PE suspected, intermediate prob, positive D-dimer;    TECHNIQUE:  Routine CT angiogram of the chest protocol performed after the IV administration of 100 mL Omnipaque 350. 3D reconstructions/reformats/MIPs obtained. All CT scans at this facility are performed  using dose modulation techniques as appropriate to performed exam including  the following:  automated exposure control; adjustment of mA and/or kV according to the patients size (this includes techniques or standardized protocols for targeted exams where dose is matched to indication/reason for exam: i.e. extremities or head);  iterative reconstruction technique.    COMPARISON:  No prior chest CT available    FINDINGS:  Patient respiratory motion limits evaluation for small peripheral pulmonary emboli.  No evidence of pulmonary embolism to the level of the proximal subsegmental pulmonary arteries.  No aortic aneurysm or dissection is identified.  There is no cardiomegaly or pericardial effusion.  No pathologically enlarged lymph nodes are seen in the chest.  There are atherosclerotic calcifications of the coronary arteries and aorta.  There are multifocal ground-glass infiltrates scattered throughout both lungs consistent with atypical pneumonia and suspicious for COVID-19 infection.  There is elevation of the right diaphragm.  No pleural effusion or pneumothorax identified.  No acute osseous abnormality is seen. Limited images through the upper abdomen demonstrate no acute findings.  Impression: No evidence of pulmonary embolism given the limitations from patient respiratory motion.    Patchy bilateral pulmonary ground-glass infiltrates consistent with atypical pneumonia and suspicious for COVID-19 infection.    Electronically signed by: Naeem García MD  Date:    08/21/2020  Time:    22:27  X-Ray Chest 1 View  Narrative: EXAMINATION:  XR CHEST 1 VIEW    CLINICAL HISTORY:  cough;    COMPARISON:  02/09/2019    FINDINGS:  The cardiomediastinal silhouette is within normal limits for AP technique. Stable elevation of the right diaphragm..  There is a possible low-grade left perihilar ground-glass infiltrate versus vascular congestion.  The remainder of the lungs appear clear of active disease.  No confluent airspace consolidation, pleural effusion or pneumothorax identified.  Impression: See  above.    Electronically signed by: Naeem García MD  Date:    08/21/2020  Time:    20:02      All imaging within the last 24 hours was reviewed.       Assessment and Plan:    Active Hospital Problems    Diagnosis  POA    *Pneumonia due to COVID-19 virus [U07.1, J12.89]  Yes    COVID-19 [U07.1]  Yes    Acute hypoxemic respiratory failure [J96.01]  Yes    BPH (benign prostatic hyperplasia) [N40.0]  Yes    CKD (chronic kidney disease), stage III [N18.3]  Yes    Elevated troponin [R79.89]  Yes    Hypertension [I10]  Yes      Resolved Hospital Problems   No resolved problems to display.        COVID-19 Virus Infection  Viral Pneumonia due to COVID-19  - COVID-19 testing: Positive on 8/21/20  - Isolation: Airborne/Droplet. Surgical mask on patient. Notify Infection Control  - Diagnostics: Trend Q48hrs if stable, more frequently if patient decompensating     Laboratory/Study Frequency Result   CBC Admit & Q48h    CMP Admit & Q48h    Magnesium level Admit    D-dimer Admit & Q48h    Ferritin Admit & Q48h    CRP Admit & Q48h    CPK Admit & Q24h if elevated    LDH Admit & Q48h    Vitamin D Admit    BNP Admit    Troponin Admit & Q6h if elevated    Glucose-6-phos dehydrogenase Admit    Procalcitonin Admit    Lipid Panel If starting statin    Rapid influenza Admit    Resp Infection Panel Admit if BMT/organ transplant    Legionella Antigen Admit    Sputum Culture Admit    Blood Culture Admit    Urinalysis & Culture Admit    ECG Admit & Q48h if on HCQ    CXR Admit    Lymphopenia, hyponatremia, hyperferritinemia, elevated troponin, elevated d-dimer, age, and medical comorbidities are significant predictors of poor clinical outcome    - Management: Per [facility] COVID Treatment Protocol (4/15/20)    - Monitoring:   - Telemetry & Continuous Pulse Oximetry    - Nutrition:    - Multivitamin PO daily   - Add Boost supplement   - Vitamin D 1000IU daily if deficient   - Ascorbic acid 500mg PO bid    - Supportive Care:   -  acetaminophen 650mg PO Q6hr PRN fever/headache   - loperamide PRN viral diarrhea   - IVF if indicated, restrictive strategy preferred, no maintenance IV if able   - VTE PPx: enoxaparin or heparin SQ unless contraindicated    - Antibiotics:   - ceftriaxone 1g Q24h x 5 days  - azithromycin 500mg po x1, then 250mg po daily x 4 days  -f/u with blood culture and sputum cultures       Acute Hypoxemic Respiratory Failure  - Order RT consult via Respiratory Communication for COVID Protocols  - Order Incentive Spirometer Q4h  - Order Flutter Valve Q4h  - Continuous Pulse Oximetry  - Goal SpO2 92-96%  - Supplemental O2 via LFNC, VentiMask, or HFNC (see Respiratory Support Oxygen Therapies)  - If wheezing, albuterol INH Q6h scheduled & PRN  - Proning Protocol if patient is a candidate (see  Proning Protocol)   - GCS >13, able to self-prone  - If deterioration, may warrant trial of NIPPV and transfer to [facility] pressure room or immediate ICU consult    -continue dexamethasone 6 mg daily   -PPI while on steroid   -pharmacy consult for Remdesivir     # HTN   -stable and continue home dose losartan     #CKD III   -Scr 1.5 which is at baseline   -monitor closely and avoid nephrotoxic agents     #BPH  -no acute issue   -continue home dose flomax and proscar     #Chronic venous stasis of legs   -no edema on exam   -hold lasix given decreased PO intake     # mild elevated troponin   -troponin 0.09 due to COVID and in setting of CKD   -no chest pain and EKG w/o ischemia   -trend with am labs   -continue aspirin            Code Status: FULL      VTE High Risk Prophylaxis:   VTE Risk Mitigation (From admission, onward)         Ordered     heparin (porcine) injection 5,000 Units  Every 8 hours      08/22/20 0229     IP VTE HIGH RISK PATIENT  Once      08/22/20 0229     Place sequential compression device  Until discontinued      08/22/20 0229                Marla Lobato DO.  [facility] Medicine.

## 2020-08-22 NOTE — CARE UPDATE
Attempted to call pts son Rhett and daughter Tasha via listed [employer] and [employer] numbers, went to voicemail or continued ringing.

## 2020-08-22 NOTE — ED NOTES
Pt departing via [facility] unit [facility] to [facility] room 25713.  Pt AAO x 4, RR even/unlabored on 2 L NC, VSS, NAD noted.   Report given to receiving nurse and transport team.

## 2020-08-22 NOTE — ED NOTES
Pt O2 sat 86 % on RA, provider notified and placed on 2 L NC, O2 sat increased to 94%.  Cont to deny SOB. Cont to monitor.

## 2020-08-22 NOTE — NURSING
On call hospitalist notified by charge nurse Mike of pt's arrival to room 56371. He will come to see patient.

## 2020-08-22 NOTE — PLAN OF CARE
Remdesivir FDA EUA Verbal Consent  The patient or parent/caregiver has been provided with the remdesivir Fact Sheet for Patients and Parents/Caregivers and has been counseled that the FDA has authorized the emergency use of remdesivir, which is not an FDA approved drug. The significant known and potential risks and benefits are unknown. The patient or parent/caregiver has been given the option to accept or refuse and has verbally agreed to receive remdesivir. Daily labs will be ordered and monitoring for Serious Adverse Events will be performed.      Will place pharmacy consult - discussed information with patient.         Curry Long M.D.  Attending Physician  [facility] Medicine Dept.  Pager: [phone]  [facility] -x [phone]

## 2020-08-22 NOTE — PLAN OF CARE
Client has done well today. All VS are WNL but do have to watch his BP arm as he will tense up or move/ use it while BP is measuring. I have had to redo his BP a few times today to get reliable results. He ambulates with help and has not had a BM so far today, but he was given meds to help. Maybe tonight or by in the morning he will. He feels good and I was able to titrate his O2 from 3L to 2L. I will be back tomorrow and will titrate him to 1L if he is still satting at 95-96% on the 2L.   His legs and feet are cool to touch, and I have found his R foot Dorsal pulse by doppler as requested. I have marked the spot with a permanent marker (blue). He started Remdesivir and tolerated well.

## 2020-08-22 NOTE — PLAN OF CARE
(Physician in Lead of Transfers)   Outside Transfer Acceptance Note / Regional Referral Center      Upon patient arrival to floor, please call extension [phone] (if no answer, this will flip to a beeper, so enter your call back number) for Hospital Medicine admit team assignment and for additional admit orders for the patient.  Do not page the attending physician associated with the patient on arrival (this physician may not be on duty at the time of arrival).  Rather, always call [phone] to reach the triage physician for orders and team assignment.      Transferring Physician: Dr. Jimenez    Accepting Physician: Shruthi Charlton MD    Date of Acceptance: 08/21/2020    Transferring Facility: [facility]    Reason for Transfer: Covid +    Report from Transferring Physician/Hospital course: The patient is a 89 y/o male with PMH of CHF, HTN, HLP, and BPH who presented with SOB, fatigue, and fever, with initial sats of 86%. Work up revealed covid +. Elevated D-dimer; CTA negative for PE. Given dexamethasone, azithromycin, ad ceftriaxone. Initial AGB showed 7.41/39/46. Placed on 2L NC and sats now up to 99%.       Labs & Radiographs: see EPIC      To Do List:   1) Admit with covid + protocols   2) Continue antibiotics        Shruthi Charlton MD  Hospital Medicine Staff

## 2020-08-22 NOTE — PLAN OF CARE
Pt alert and oriented x4. VSS. O2 3L NC in use with O2 sats 96%. Resting well. Denies any pain or discomfort. Safety maintained. Will continue to monitor.

## 2020-08-22 NOTE — ED PROVIDER NOTES
[facility]                                                 Chief Complaint  89 y.o. male with Cough      History of Present Illness  Pinky Rivera presents to the emergency room with shortness of breath  Patient with cough shortness of breath an obvious hypoxia on ER evaluation  Patient has signs and symptoms suspicious for COVID infection on interview  Patient has been around several sick individuals, 86% oxygenation on ER triage    The history is provided by the patient   device was not used during this ER visit  Medical history significant for blind eye, BPH, carotid disease, CHF, HLD, HTN BBB  Surgeries significant for appendectomy, gallbladder, colonoscopy and tonsils  No known allergy    I have reviewed all of this patient's past medical, surgical, family, and social   histories as well as active allergies and medications documented in the  electronic medical record    Review of Systems and Physical Exam      Review of Systems  -- Constitution - fever, denies fatigue, no weakness, no chills  -- Eyes - no tearing or redness, no visual disturbance  -- Ear, Nose - sneezing, nasal congestion and clear discharge   -- Mouth,Throat - sore throat, no toothache, normal voice, normal swallowing  -- Respiratory - cough and congestion, shortness of breath, no QUIROZ  -- Cardiovascular - denies chest pain, no palpitations, denies claudication  -- Gastrointestinal - denies abdominal pain, nausea, vomiting, or diarrhea  -- Genitourinary - no dysuria, no hematuria, no flank pain, no bladder pain  -- Musculoskeletal - denies back pain, negative for trauma or injury  -- Neurological - no headache, denies weakness or seizure; no LOC  -- Skin - denies pallor, rash, or changes in skin. no hives or welts noted     Vital Signs  Temporal temperature is 98 °F (36.7 °C).   His blood pressure is 164/75 and his pulse is 91.   His respiration is 20 and oxygen saturation is 89%     Physical  Exam  -- Nursing note and vitals reviewed  -- Constitutional: Appears well-developed and well-nourished  -- Head: Atraumatic. Normocephalic. No obvious abnormality  -- Eyes: Pupils are equal and reactive to light. Normal conjunctiva and lids  -- Nose: nasal mucosa erythema and edema; clear nasal discharge noted   -- Throat: post-nasal drip with mild posterior oropharnyx erythema  -- Ears: External ears and TM normal bilaterally. Normal hearing and no drainage  -- Neck: Normal range of motion. Neck supple. No masses, trachea midline  -- Cardiac: Normal rate, regular rhythm and normal heart sounds  -- Pulmonary: faint rhonchi at the bilateral bases with no active wheezing   -- Abdominal: Soft, no tenderness. Normal bowel sounds. Normal liver edge  -- Musculoskeletal: Normal range of motion, no effusions. Joints stable   -- Neurological: No focal deficits. Showed good interaction with staff  -- Vascular: Posterior tibial, dorsalis pedis and radial pulses 2+ bilaterally       Emergency Room Course      Lab Results     K 4.9      CO2 28   BUN 28 (H)   CREATININE 1.5 (H)      ALKPHOS 53 (L)   AST 30   ALT 13   BILITOT 0.5   ALBUMIN 3.3 (L)   PROT 7.3   WBC 4.98   HGB 14.0   HCT 43.6       (H)    (H)   CPKMB 3.1   TROPONINI 0.093 (H)   INR 1.0   BNP 46   DDIMER 4.82 (H)   LACTATE 1.2     Urinalysis  -- Urinalysis performed during this ER visit showed no signs of infection      EKG  -- with a branch block on EKG today    CT PE study  No evidence of pulmonary embolism given the limitations from patient respiratory motion.   Patchy bilateral pulmonary ground-glass infiltrates consistent with atypical pneumonia and suspicious for COVID-19 infection.     Additional Work up  -- rapid Coronavirus PCR was positive  -- Blood cultures have also been drawn, results are pending  -- CRP, ESR, procalcitonin, LDH and ferritin pending    Medications Given  azithromycin 500 mg in dextrose 5 % 250 mL  IVPB (ready to mix system)    sodium chloride 0.9% bolus 500 mL (0 mLs Intravenous Stopped 8/21/20 2223)   iohexoL (OMNIPAQUE 350) injection 100 mL (100 mLs Intravenous Given 8/21/20 2219)     Arterial blood gas   PH 7.41   PCO2 39   PO2 46*   HCO3 24.70   POCSATURATED 86.7*   BE 0.10      Critical Care ED Physician Time (minutes):  -- Performed by: Burke Jimenez M.D.  -- Date/Time: 10:37 PM 8/21/2020   -- Direct Patient Care (Face Time): 5  -- Additional History from Records or Taking Additional History: 0  -- Ordering, Reviewing, and Interpreting Diagnostic Studies: 5  -- Total Time in Documentation: 5  -- Consultation with Other Physicians: 5  -- Consultation with Family Related to Condition: 0  -- Total Critical Care Time: 20     ED Physician Management  -- Diagnosis management comments: 89 y.o. male with shortness of breath  -- patient with shortness of breath, patient with hypoxia on ER evaluation  -- COVID positive with atypical pneumonia findings on CT PE study  -- patient needs oxygen, patient needs to be admitted in hospital tonight  -- IV Zithromax given for COVID infection in the ER this morning  -- we do not have a bed for this patient, transfer pending at this time    Diagnosis  Final diagnoses:  [R05] Cough  [U07.1] COVID-19 virus infection (Primary)  [R06.00] Dyspnea, unspecified type  [R09.02] Hypoxia  [J96.90] Respiratory failure, unspecified chronicity, unspecified whether with hypoxia or hypercapnia  [J18.9] Pneumonia due to infectious organism, unspecified laterality, unspecified part of lung    Disposition and Plan  -- Disposition: transfer  -- Condition: stable    This note is dictated on M*Modal word recognition program.  There are word recognition mistakes that are occasionally missed on review.         Burke Jimenez MD  08/21/20 1163

## 2020-08-23 ENCOUNTER — NURSE TRIAGE (OUTPATIENT)
Dept: ADMINISTRATIVE | Facility: CLINIC | Age: 85
End: 2020-08-23

## 2020-08-23 LAB
ALBUMIN SERPL BCP-MCNC: 2.8 G/DL (ref 3.5–5.2)
ALP SERPL-CCNC: 53 U/L (ref 55–135)
ALT SERPL W/O P-5'-P-CCNC: 13 U/L (ref 10–44)
ANION GAP SERPL CALC-SCNC: 9 MMOL/L (ref 8–16)
AST SERPL-CCNC: 26 U/L (ref 10–40)
BASOPHILS # BLD AUTO: 0.01 K/UL (ref 0–0.2)
BASOPHILS NFR BLD: 0.1 % (ref 0–1.9)
BILIRUB SERPL-MCNC: 0.3 MG/DL (ref 0.1–1)
BUN SERPL-MCNC: 28 MG/DL (ref 8–23)
CALCIUM SERPL-MCNC: 8.8 MG/DL (ref 8.7–10.5)
CHLORIDE SERPL-SCNC: 106 MMOL/L (ref 95–110)
CK SERPL-CCNC: 149 U/L (ref 20–200)
CO2 SERPL-SCNC: 26 MMOL/L (ref 23–29)
CREAT SERPL-MCNC: 1.1 MG/DL (ref 0.5–1.4)
CRP SERPL-MCNC: 62.1 MG/L (ref 0–8.2)
DIFFERENTIAL METHOD: ABNORMAL
EOSINOPHIL # BLD AUTO: 0 K/UL (ref 0–0.5)
EOSINOPHIL NFR BLD: 0 % (ref 0–8)
ERYTHROCYTE [DISTWIDTH] IN BLOOD BY AUTOMATED COUNT: 13.8 % (ref 11.5–14.5)
EST. GFR  (AFRICAN AMERICAN): >60 ML/MIN/1.73 M^2
EST. GFR  (NON AFRICAN AMERICAN): 58.8 ML/MIN/1.73 M^2
FERRITIN SERPL-MCNC: 456 NG/ML (ref 20–300)
GLUCOSE SERPL-MCNC: 122 MG/DL (ref 70–110)
HCT VFR BLD AUTO: 45.1 % (ref 40–54)
HGB BLD-MCNC: 14.1 G/DL (ref 14–18)
IMM GRANULOCYTES # BLD AUTO: 0.05 K/UL (ref 0–0.04)
IMM GRANULOCYTES NFR BLD AUTO: 0.5 % (ref 0–0.5)
LDH SERPL L TO P-CCNC: 275 U/L (ref 110–260)
LYMPHOCYTES # BLD AUTO: 0.5 K/UL (ref 1–4.8)
LYMPHOCYTES NFR BLD: 5.1 % (ref 18–48)
MAGNESIUM SERPL-MCNC: 2.1 MG/DL (ref 1.6–2.6)
MCH RBC QN AUTO: 31.3 PG (ref 27–31)
MCHC RBC AUTO-ENTMCNC: 31.3 G/DL (ref 32–36)
MCV RBC AUTO: 100 FL (ref 82–98)
MONOCYTES # BLD AUTO: 0.6 K/UL (ref 0.3–1)
MONOCYTES NFR BLD: 5.9 % (ref 4–15)
NEUTROPHILS # BLD AUTO: 8.3 K/UL (ref 1.8–7.7)
NEUTROPHILS NFR BLD: 88.4 % (ref 38–73)
NRBC BLD-RTO: 0 /100 WBC
PHOSPHATE SERPL-MCNC: 3.1 MG/DL (ref 2.7–4.5)
PLATELET # BLD AUTO: 195 K/UL (ref 150–350)
PMV BLD AUTO: 10.2 FL (ref 9.2–12.9)
POTASSIUM SERPL-SCNC: 5 MMOL/L (ref 3.5–5.1)
PROT SERPL-MCNC: 6.6 G/DL (ref 6–8.4)
RBC # BLD AUTO: 4.5 M/UL (ref 4.6–6.2)
SODIUM SERPL-SCNC: 141 MMOL/L (ref 136–145)
WBC # BLD AUTO: 9.37 K/UL (ref 3.9–12.7)

## 2020-08-23 PROCEDURE — 36415 COLL VENOUS BLD VENIPUNCTURE: CPT

## 2020-08-23 PROCEDURE — 63600175 PHARM REV CODE 636 W HCPCS: Performed by: HOSPITALIST

## 2020-08-23 PROCEDURE — 93005 ELECTROCARDIOGRAM TRACING: CPT

## 2020-08-23 PROCEDURE — 63700000 PHARM REV CODE 250 ALT 637 W/O HCPCS: Performed by: HOSPITALIST

## 2020-08-23 PROCEDURE — 83615 LACTATE (LD) (LDH) ENZYME: CPT

## 2020-08-23 PROCEDURE — 25000242 PHARM REV CODE 250 ALT 637 W/ HCPCS: Performed by: HOSPITALIST

## 2020-08-23 PROCEDURE — 85025 COMPLETE CBC W/AUTO DIFF WBC: CPT

## 2020-08-23 PROCEDURE — 94640 AIRWAY INHALATION TREATMENT: CPT

## 2020-08-23 PROCEDURE — 80053 COMPREHEN METABOLIC PANEL: CPT

## 2020-08-23 PROCEDURE — 27000221 HC OXYGEN, UP TO 24 HOURS

## 2020-08-23 PROCEDURE — 99233 SBSQ HOSP IP/OBS HIGH 50: CPT | Mod: ,,, | Performed by: HOSPITALIST

## 2020-08-23 PROCEDURE — 11000001 HC ACUTE MED/SURG PRIVATE ROOM

## 2020-08-23 PROCEDURE — 93010 ELECTROCARDIOGRAM REPORT: CPT | Mod: ,,, | Performed by: INTERNAL MEDICINE

## 2020-08-23 PROCEDURE — 82550 ASSAY OF CK (CPK): CPT

## 2020-08-23 PROCEDURE — 25000003 PHARM REV CODE 250: Performed by: HOSPITALIST

## 2020-08-23 PROCEDURE — 84100 ASSAY OF PHOSPHORUS: CPT

## 2020-08-23 PROCEDURE — 93010 EKG 12-LEAD: ICD-10-PCS | Mod: ,,, | Performed by: INTERNAL MEDICINE

## 2020-08-23 PROCEDURE — 99233 PR SUBSEQUENT HOSPITAL CARE,LEVL III: ICD-10-PCS | Mod: ,,, | Performed by: HOSPITALIST

## 2020-08-23 PROCEDURE — 94761 N-INVAS EAR/PLS OXIMETRY MLT: CPT

## 2020-08-23 PROCEDURE — 99900035 HC TECH TIME PER 15 MIN (STAT)

## 2020-08-23 PROCEDURE — A4216 STERILE WATER/SALINE, 10 ML: HCPCS | Performed by: HOSPITALIST

## 2020-08-23 PROCEDURE — 86140 C-REACTIVE PROTEIN: CPT

## 2020-08-23 PROCEDURE — 83735 ASSAY OF MAGNESIUM: CPT

## 2020-08-23 PROCEDURE — 82728 ASSAY OF FERRITIN: CPT

## 2020-08-23 RX ORDER — ALBUTEROL SULFATE 90 UG/1
2 AEROSOL, METERED RESPIRATORY (INHALATION) EVERY 6 HOURS
Status: DISCONTINUED | OUTPATIENT
Start: 2020-08-23 | End: 2020-08-25

## 2020-08-23 RX ORDER — LACTULOSE 10 G/15ML
20 SOLUTION ORAL ONCE
Status: COMPLETED | OUTPATIENT
Start: 2020-08-23 | End: 2020-08-23

## 2020-08-23 RX ADMIN — ALBUTEROL SULFATE 2 PUFF: 90 AEROSOL, METERED RESPIRATORY (INHALATION) at 12:08

## 2020-08-23 RX ADMIN — FINASTERIDE 5 MG: 5 TABLET, FILM COATED ORAL at 10:08

## 2020-08-23 RX ADMIN — GUAIFENESIN AND DEXTROMETHORPHAN 10 ML: 100; 10 SYRUP ORAL at 08:08

## 2020-08-23 RX ADMIN — CEFTRIAXONE SODIUM 1 G: 1 INJECTION, POWDER, FOR SOLUTION INTRAMUSCULAR; INTRAVENOUS at 08:08

## 2020-08-23 RX ADMIN — ASPIRIN 81 MG: 81 TABLET, COATED ORAL at 08:08

## 2020-08-23 RX ADMIN — TAMSULOSIN HYDROCHLORIDE 0.4 MG: 0.4 CAPSULE ORAL at 10:08

## 2020-08-23 RX ADMIN — DEXAMETHASONE 6 MG: 4 TABLET ORAL at 08:08

## 2020-08-23 RX ADMIN — AZITHROMYCIN 250 MG: 250 TABLET, FILM COATED ORAL at 08:08

## 2020-08-23 RX ADMIN — ENOXAPARIN SODIUM 40 MG: 40 INJECTION SUBCUTANEOUS at 05:08

## 2020-08-23 RX ADMIN — ALBUTEROL SULFATE 2 PUFF: 90 AEROSOL, METERED RESPIRATORY (INHALATION) at 07:08

## 2020-08-23 RX ADMIN — Medication 500 MG: at 08:08

## 2020-08-23 RX ADMIN — LACTULOSE 20 G: 20 SOLUTION ORAL at 03:08

## 2020-08-23 RX ADMIN — CHOLECALCIFEROL (VITAMIN D3) 25 MCG (1,000 UNIT) TABLET 1000 UNITS: at 08:08

## 2020-08-23 RX ADMIN — REMDESIVIR 100 MG: 100 INJECTION, POWDER, LYOPHILIZED, FOR SOLUTION INTRAVENOUS at 03:08

## 2020-08-23 RX ADMIN — LOSARTAN POTASSIUM 50 MG: 25 TABLET, FILM COATED ORAL at 08:08

## 2020-08-23 RX ADMIN — ALBUTEROL SULFATE 2 PUFF: 90 AEROSOL, METERED RESPIRATORY (INHALATION) at 01:08

## 2020-08-23 RX ADMIN — Medication 10 ML: at 08:08

## 2020-08-23 NOTE — PROGRESS NOTES
[facility] Medicine  Progress Note    Patient Name: Pinky Rivera  MRN: [id number]  Patient Class: IP- Inpatient   Admission Date: 8/22/2020  Length of Stay: 0 days  Attending Physician: Curry Long MD  Primary Care Provider: Luly Ennis NP        Subjective:     Principal Problem:Pneumonia due to COVID-19 virus    Interval History: assumed care of patient  He is on 2L nasal cannula    Pt seen, RN at bedside, pt with right eye blindness, patient is awake, alert, oriented    Remdesivir consult completed and will     ADL - toilets/bathes/clothes/feeds/prepares food/manages meds/uses cane/  -doesnn't drive,     Review of Systems   Constitutional: Positive for appetite change and fatigue.   Respiratory: Positive for cough and shortness of breath.    Cardiovascular: Negative for chest pain and leg swelling.   Gastrointestinal: Negative for abdominal distention, abdominal pain and diarrhea.     Objective:     Vital Signs (Most Recent):  Temp: 97.4 °F (36.3 °C) (08/22/20 2316)  Pulse: (!) 53 (08/22/20 2316)  Resp: (!) 25 (08/22/20 2316)  BP: (!) 141/78 (08/22/20 2316)  SpO2: 95 % (08/22/20 2316) Vital Signs (24h Range):  Temp:  [97.4 °F (36.3 °C)-100.2 °F (37.9 °C)] 97.4 °F (36.3 °C)  Pulse:  [48-88] 53  Resp:  [21-35] 25  SpO2:  [91 %-97 %] 95 %  BP: (123-169)/(64-86) 141/78     Weight: 94 kg (207 lb 2 oz)  Body mass index is 32.44 kg/m².    Intake/Output Summary (Last 24 hours) at 8/22/2020 2335  Last data filed at 8/22/2020 1825  Gross per 24 hour   Intake 10 ml   Output 975 ml   Net -965 ml      Physical Exam  Constitutional:       Appearance: He is obese. He is ill-appearing.   Eyes:      General: No scleral icterus.  Cardiovascular:      Rate and Rhythm: Normal rate and regular rhythm.   Pulmonary:      Effort: Pulmonary effort is normal.      Breath sounds: Rales present. No wheezing.   Abdominal:      General: Abdomen is flat. Bowel sounds are normal.       Palpations: Abdomen is soft.   Musculoskeletal:      Comments: Feet cool to touch, left 1+,  Right unable to palp dp pulse,   Skin:     General: Skin is warm and dry.   Neurological:      General: No focal deficit present.      Mental Status: He is alert and oriented to person, place, and time.               Significant Labs:   CBC:   Recent Labs   Lab 08/21/20 1905 08/22/20  0509 08/23/20  0309   WBC 4.98 4.13 9.37   HGB 14.0 12.9* 14.1   HCT 43.6 40.7 45.1    169 195     CMP:   Recent Labs   Lab 08/21/20 1905 08/22/20  0509 08/23/20  0309    139 141   K 4.9 4.5 5.0    106 106   CO2 28 24 26    136* 122*   BUN 28* 23 28*   CREATININE 1.5* 1.2 1.1   CALCIUM 8.8 8.4* 8.8   PROT 7.3 6.5 6.6   ALBUMIN 3.3* 2.8* 2.8*   BILITOT 0.5 0.4 0.3   ALKPHOS 53* 50* 53*   AST 30 30 26   ALT 13 13 13   ANIONGAP 9 9 9   EGFRNONAA 40* 52.9* 58.8*     Recent Labs     08/21/20 1905 08/22/20  0509 08/23/20  0309   DDIMER 4.82*  --   --    FERRITIN 455*  --  456*   CRP 83.5*  --  62.1*   *  --  275*   BNP 46  --   --    TROPONINI 0.093* 0.074*  --    *  350* 273* 149         All pertinent labs within the past 24 hours have been reviewed.    Significant Imaging: I have reviewed all pertinent imaging results/findings within the past 24 hours.    Assessment/Plan:      COVID-19 Virus Infection  Viral Pneumonia due to COVID-19  - COVID-19 testing: Positive on 8/21/20  - Isolation: Airborne/Droplet. Surgical mask on patient. Notify Infection Control  - Diagnostics: Trend Q48hrs if stable, more frequently if patient decompensating      >blood cultures, flu swab, strep A screen negative   -D-dimer elevated, CRP downtrending/CK downtrending, LDH stable  -continue dexamethasone  -as pt requiring O2 yesterday was consulted and remdesivir initiated (D2/5)    Acute Hypoxemic Respiratory Failure  -continue dexamethasone 6 mg daily   -PPI while on steroid   -pharmacy consult for Remdesivir     # HTN    -stable and continue home dose losartan      #CKD III   -Scr 1.5 which is at baseline   -monitor closely and avoid nephrotoxic agents   -Cr improved downtrending      #BPH  -no acute issue   -continue home dose flomax and proscar      #Chronic venous stasis of legs   -no edema on exam   -hold lasix given decreased PO intake   -feet cold to touch, rn notes DP pulses are dopplerable      # mild elevated troponin   -troponin 0.09 due to COVID and in setting of CKD   -no chest pain and EKG w/o ischemia   -trend shows flat to downtrending.   -continue aspirin     Constipation-slow transit  -started routine bowel regimen/senna-docusate, miralax  -add lactulose x 1 today, pt reports feeling full/bloated    Active Diagnoses:    Diagnosis Date Noted POA    PRINCIPAL PROBLEM:  Pneumonia due to COVID-19 virus [U07.1, J12.89] 08/22/2020 Yes    Acute hypoxemic respiratory failure [J96.01] 08/22/2020 Yes    Essential hypertension [I10]  Yes    CKD (chronic kidney disease), stage III [N18.3] 08/22/2020 Yes    BPH (benign prostatic hyperplasia) [N40.0] 08/22/2020 Yes    Elevated troponin [R79.89] 08/22/2020 Yes    Blindness of right eye [H54.40] 08/22/2020 Yes     Chronic      Problems Resolved During this Admission:     VTE Risk Mitigation (From admission, onward)         Ordered     enoxaparin injection 40 mg  Every 24 hours      08/22/20 1357     IP VTE HIGH RISK PATIENT  Once      08/22/20 0229     Place sequential compression device  Until discontinued      08/22/20 0229                   Curry Long MD  Department of Hospital Medicine   [facility]

## 2020-08-23 NOTE — PLAN OF CARE
Plan of care discussed and reviewed with pt and pt's daughter. Treatment plan and expected outcomes were explained. Pt meeting goals as expected. Will continue to monitor and enforce plan of care.

## 2020-08-23 NOTE — NURSING
Pt HR sustaining in 30's while sleeping. Pt is asymptomatic. Faustino Espino MD paged. Orders for EKG received. Will continue to monitor.

## 2020-08-23 NOTE — PLAN OF CARE
Client has done well today. He was able to get to the chair, call several of his family members and wash himself today. He was able to independently brush his teeth. He needed help getting undressed/ dressed due to gown. He has a shuffle gait and I had him bumped up to 4L O2 while moving around and washing himself. He did not de-sat at all. Once he was back in the chair, I turned him back down to 2L and he recovered easily. He is worried about the weather, but knows that he has to get stronger before he can leave. He continues to work with the IS and PEP machine and his cough is almost completely gone. He only needed cough syrup once today.     Will relay to oncoming shift the importance of ambulating him to the toilet or BSC now that he has enough extension tubing for his O2 so we can build up his reserve and work his lungs.

## 2020-08-23 NOTE — NURSING
Report received and VS taken. Got client up to chair for breakfast. He is not satting well while sitting up on 2L, so I have bumped him back up to 4L O2 and will notify RT about findings. He is asymptomatic for now, and will monitor, but he seems to be a mouth breather.     UPDATE:  RT came and assessed Mr Rivera and thinks that the mvmt into the chair exacerbated and depleted his reserve. He moved him back down to 2L O2 and now he is back up to 94%. I will try to ambulate the client more today and watch his sats to see if he gets better after using his IS and PEP this morning every hour as prescribed.

## 2020-08-24 LAB
ALBUMIN SERPL BCP-MCNC: 2.9 G/DL (ref 3.5–5.2)
ALP SERPL-CCNC: 60 U/L (ref 55–135)
ALT SERPL W/O P-5'-P-CCNC: 19 U/L (ref 10–44)
ANION GAP SERPL CALC-SCNC: 12 MMOL/L (ref 8–16)
AST SERPL-CCNC: 29 U/L (ref 10–40)
BASOPHILS # BLD AUTO: 0.01 K/UL (ref 0–0.2)
BASOPHILS NFR BLD: 0.1 % (ref 0–1.9)
BILIRUB SERPL-MCNC: 0.4 MG/DL (ref 0.1–1)
BUN SERPL-MCNC: 30 MG/DL (ref 8–23)
CALCIUM SERPL-MCNC: 8.3 MG/DL (ref 8.7–10.5)
CHLORIDE SERPL-SCNC: 106 MMOL/L (ref 95–110)
CO2 SERPL-SCNC: 21 MMOL/L (ref 23–29)
CREAT SERPL-MCNC: 1.2 MG/DL (ref 0.5–1.4)
DIFFERENTIAL METHOD: ABNORMAL
EOSINOPHIL # BLD AUTO: 0 K/UL (ref 0–0.5)
EOSINOPHIL NFR BLD: 0 % (ref 0–8)
ERYTHROCYTE [DISTWIDTH] IN BLOOD BY AUTOMATED COUNT: 14 % (ref 11.5–14.5)
EST. GFR  (AFRICAN AMERICAN): >60 ML/MIN/1.73 M^2
EST. GFR  (NON AFRICAN AMERICAN): 52.9 ML/MIN/1.73 M^2
GLUCOSE SERPL-MCNC: 123 MG/DL (ref 70–110)
HCT VFR BLD AUTO: 46.2 % (ref 40–54)
HGB BLD-MCNC: 14.4 G/DL (ref 14–18)
IMM GRANULOCYTES # BLD AUTO: 0.08 K/UL (ref 0–0.04)
IMM GRANULOCYTES NFR BLD AUTO: 0.9 % (ref 0–0.5)
LYMPHOCYTES # BLD AUTO: 0.3 K/UL (ref 1–4.8)
LYMPHOCYTES NFR BLD: 3.6 % (ref 18–48)
MAGNESIUM SERPL-MCNC: 2.1 MG/DL (ref 1.6–2.6)
MCH RBC QN AUTO: 31.4 PG (ref 27–31)
MCHC RBC AUTO-ENTMCNC: 31.2 G/DL (ref 32–36)
MCV RBC AUTO: 101 FL (ref 82–98)
MONOCYTES # BLD AUTO: 0.8 K/UL (ref 0.3–1)
MONOCYTES NFR BLD: 8.5 % (ref 4–15)
NEUTROPHILS # BLD AUTO: 8.1 K/UL (ref 1.8–7.7)
NEUTROPHILS NFR BLD: 86.9 % (ref 38–73)
NRBC BLD-RTO: 0 /100 WBC
PHOSPHATE SERPL-MCNC: 2.9 MG/DL (ref 2.7–4.5)
PLATELET # BLD AUTO: 173 K/UL (ref 150–350)
PMV BLD AUTO: 10.5 FL (ref 9.2–12.9)
POTASSIUM SERPL-SCNC: 4.9 MMOL/L (ref 3.5–5.1)
PROT SERPL-MCNC: 6.5 G/DL (ref 6–8.4)
RBC # BLD AUTO: 4.59 M/UL (ref 4.6–6.2)
SODIUM SERPL-SCNC: 139 MMOL/L (ref 136–145)
WBC # BLD AUTO: 9.26 K/UL (ref 3.9–12.7)

## 2020-08-24 PROCEDURE — 97530 THERAPEUTIC ACTIVITIES: CPT

## 2020-08-24 PROCEDURE — 84100 ASSAY OF PHOSPHORUS: CPT

## 2020-08-24 PROCEDURE — 99232 PR SUBSEQUENT HOSPITAL CARE,LEVL II: ICD-10-PCS | Mod: ,,, | Performed by: HOSPITALIST

## 2020-08-24 PROCEDURE — 36415 COLL VENOUS BLD VENIPUNCTURE: CPT

## 2020-08-24 PROCEDURE — 63700000 PHARM REV CODE 250 ALT 637 W/O HCPCS: Performed by: HOSPITALIST

## 2020-08-24 PROCEDURE — 83735 ASSAY OF MAGNESIUM: CPT

## 2020-08-24 PROCEDURE — 63600175 PHARM REV CODE 636 W HCPCS: Performed by: HOSPITALIST

## 2020-08-24 PROCEDURE — 94640 AIRWAY INHALATION TREATMENT: CPT

## 2020-08-24 PROCEDURE — 97161 PT EVAL LOW COMPLEX 20 MIN: CPT

## 2020-08-24 PROCEDURE — 85025 COMPLETE CBC W/AUTO DIFF WBC: CPT

## 2020-08-24 PROCEDURE — 25000003 PHARM REV CODE 250: Performed by: HOSPITALIST

## 2020-08-24 PROCEDURE — 80053 COMPREHEN METABOLIC PANEL: CPT

## 2020-08-24 PROCEDURE — 27000221 HC OXYGEN, UP TO 24 HOURS

## 2020-08-24 PROCEDURE — 11000001 HC ACUTE MED/SURG PRIVATE ROOM

## 2020-08-24 PROCEDURE — 94761 N-INVAS EAR/PLS OXIMETRY MLT: CPT

## 2020-08-24 PROCEDURE — 97165 OT EVAL LOW COMPLEX 30 MIN: CPT

## 2020-08-24 PROCEDURE — 99232 SBSQ HOSP IP/OBS MODERATE 35: CPT | Mod: ,,, | Performed by: HOSPITALIST

## 2020-08-24 PROCEDURE — 97116 GAIT TRAINING THERAPY: CPT

## 2020-08-24 RX ORDER — PANTOPRAZOLE SODIUM 40 MG/1
40 TABLET, DELAYED RELEASE ORAL DAILY
Status: DISCONTINUED | OUTPATIENT
Start: 2020-08-25 | End: 2020-08-28 | Stop reason: HOSPADM

## 2020-08-24 RX ADMIN — ALBUTEROL SULFATE 2 PUFF: 90 AEROSOL, METERED RESPIRATORY (INHALATION) at 12:08

## 2020-08-24 RX ADMIN — ALBUTEROL SULFATE 2 PUFF: 90 AEROSOL, METERED RESPIRATORY (INHALATION) at 01:08

## 2020-08-24 RX ADMIN — Medication 500 MG: at 08:08

## 2020-08-24 RX ADMIN — LOSARTAN POTASSIUM 50 MG: 25 TABLET, FILM COATED ORAL at 08:08

## 2020-08-24 RX ADMIN — ALBUTEROL SULFATE 2 PUFF: 90 AEROSOL, METERED RESPIRATORY (INHALATION) at 07:08

## 2020-08-24 RX ADMIN — CHOLECALCIFEROL (VITAMIN D3) 25 MCG (1,000 UNIT) TABLET 1000 UNITS: at 08:08

## 2020-08-24 RX ADMIN — DEXAMETHASONE 6 MG: 4 TABLET ORAL at 08:08

## 2020-08-24 RX ADMIN — REMDESIVIR 100 MG: 100 INJECTION, POWDER, LYOPHILIZED, FOR SOLUTION INTRAVENOUS at 03:08

## 2020-08-24 RX ADMIN — TAMSULOSIN HYDROCHLORIDE 0.4 MG: 0.4 CAPSULE ORAL at 08:08

## 2020-08-24 RX ADMIN — ASPIRIN 81 MG: 81 TABLET, COATED ORAL at 08:08

## 2020-08-24 RX ADMIN — AZITHROMYCIN 250 MG: 250 TABLET, FILM COATED ORAL at 08:08

## 2020-08-24 RX ADMIN — CEFTRIAXONE SODIUM 1 G: 1 INJECTION, POWDER, FOR SOLUTION INTRAMUSCULAR; INTRAVENOUS at 08:08

## 2020-08-24 RX ADMIN — ENOXAPARIN SODIUM 40 MG: 40 INJECTION SUBCUTANEOUS at 05:08

## 2020-08-24 RX ADMIN — FINASTERIDE 5 MG: 5 TABLET, FILM COATED ORAL at 08:08

## 2020-08-24 NOTE — PLAN OF CARE
Plan of care reviewed and discusses regarding treatment plan and expected outcomes. Chloé (daughter) given update on pt's status. Questions encouraged and answered. Pt verbalized understanding. Pt currently progressing as expected. Will continue to monitor and enforce plan of care.

## 2020-08-24 NOTE — PLAN OF CARE
Problem: Occupational Therapy Goal  Goal: Occupational Therapy Goal  Description: Goals to be met by: 9/7/2020     Patient will increase functional independence with ADLs by performing:    UE Dressing with [place].  LE Dressing with Minimal Assistance.  Grooming while standing with Stand-by Assistance.  Toileting from toilet with Minimal Assistance for hygiene and clothing management.   Toilet transfer to toilet with Stand-by Assistance.    Outcome: Ongoing, Progressing    OT evaluation completed and POC established.  Juliette Manzo OT  8/24/2020

## 2020-08-24 NOTE — PLAN OF CARE
Problem: Physical Therapy Goal  Goal: Physical Therapy Goal  Description: Goals to be met by: 2020     Patient will increase functional independence with mobility by performin. Supine to sit with Modified [place]  2. Sit to stand transfer with Modified [place]  3. Gait  x 100 feet with Modified [place] using Rolling Walker or SPC   4. Stand for 5 minutes with Modified [place] using Rolling Walker or SPC     Outcome: Ongoing, Progressing     Pt evaluated and appropriate goals established.     Elvira Shaikh, PT, DPT  2020  [phone]

## 2020-08-24 NOTE — PT/OT/SLP EVAL
Physical Therapy Evaluation and Treatment     Patient Name:  Pinky Rivera   MRN:  [id number]    *co-treatment with OT   Recommendations:     Discharge Recommendations:  home with home health   Discharge Equipment Recommendations: none   Barriers to discharge: None    Assessment:     Pinky Rievra is a 89 y.o. male admitted with a medical diagnosis of Pneumonia due to COVID-19 virus.  He presents with the following impairments/functional limitations:  weakness, impaired endurance, impaired self care skills, gait instability, impaired balance, impaired functional mobilty, impaired cardiopulmonary response to activity. Pt tolerated activity with mobility primarily limited by respiratory status. Pt ambulating with stand by assistance with use of a RW, close to baseline per pt report. Pt SpO2 decreased to 84% on 4L O2, slowly improved to 89% with seated rest. Pt is at risk for medical decompensation and decline in mobility without daily and frequent OOB mobility and ambulating. Pt encouraged to ambulate with RW and nursing assistance pending respiratory status. Pt would continue to benefit from acute skilled therapy intervention to address deficits and progress toward prior level of function.       Rehab Prognosis: Good; patient would benefit from acute skilled PT services to address these deficits and reach maximum level of function.    Recent Surgery: * No surgery found *      Plan:     During this hospitalization, patient to be seen 4 x/week to address the identified rehab impairments via gait training, therapeutic activities, therapeutic exercises, neuromuscular re-education and progress toward the following goals:    · Plan of Care Expires:  09/24/20    Subjective     Chief Complaint: pt c/o pain in knee, c/o fatigue and weakness   Patient/Family Comments/goals: to get better and return home   Pain/Comfort:  · Pain Rating 1: (Pt reports pain in L knee, due to arthritis, did not quantify pain)  · Pain  Addressed 1: Distraction, Cessation of Activity  · Pain Rating Post-Intervention 1: 0/10    Patients cultural, spiritual, Shinto conflicts given the current situation: no    Living Environment:  Pt lives with his wife in a SSH with a threshold CHRIS.   Prior to admission, patients level of function was modified independent with mobility and ADLs with use of a SPC most of the time, and occasionally used a RW. Per RN, pt cares for his wife who is mostly blind.  Equipment used at home: walker, rolling, cane, straight, grab bar, shower chair.  DME owned (not currently used): none.  Upon discharge, patient will have assistance from family.    Objective:     Communicated with RN prior to session.  Patient found HOB elevated with pulse ox (continuous), telemetry, oxygen  upon PT entry to room.    General Precautions: Standard, airborne, droplet, fall, contact   Orthopedic Precautions:N/A   Braces: N/A     Exams:  · Cognitive Exam:  Patient is AAOx4, followed all commands, communicates clearly and fluently, hard of hearing   · Vision: blind in R eye   · Gross Motor Coordination:  WFL  · RLE ROM: WFL  · RLE Strength: WFL  · LLE ROM: WFL  · LLE Strength: WFL    Functional Mobility:  · Bed Mobility:     · Supine to Sit: stand by assistance  · Transfers:     · Sit to Stand:  stand by assistance with rolling walker  · Gait: Pt ambulated 16 ft with RW and stand by assistance. Pt demo'd small step size, decreased foot clearance, narrow TARYN, flexed posutre. Pt with no LOB, no SOB, no dizziness.     Therapeutic Activities and Exercises:   Pt ambulated to couch, stood and looked out window with SBA and use of RW. Pt reports he is close to baseline mobility. Pt SpO2 decreased 89%-84% while ambulating, improved to 88-89% with rest on 4L O2.   Pt educated on role of PT/POC. Pt verbalized understanding.   Pt encouraged to only perform OOB mobility with assistance from nursing/therapy. Pt agreeable.   Pt encouraged to ambulate daily  with assistance/supervision from nursing/therapy. Pt agreeable.  RW in room for use with staff assistance.     AM-PAC 6 CLICK MOBILITY  Total Score:18     Patient left up in chair with all lines intact, call button in reach and RN notified.    GOALS:   Multidisciplinary Problems     Physical Therapy Goals        Problem: Physical Therapy Goal    Goal Priority Disciplines Outcome Goal Variances Interventions   Physical Therapy Goal     PT, PT/OT Ongoing, Progressing     Description: Goals to be met by: 2020     Patient will increase functional independence with mobility by performin. Supine to sit with Modified [place]  2. Sit to stand transfer with Modified [place]  3. Gait  x 100 feet with Modified [place] using Rolling Walker or SPC   4. Stand for 5 minutes with Modified [place] using Rolling Walker or SPC                      History:     Past Medical History:   Diagnosis Date    Bladder stone     Blind right eye     detached retina    BPH (benign prostatic hypertrophy)     Carotid artery disease     CHF (congestive heart failure)     [place] (hard of hearing)     Hyperlipidemia     Hypertension     Incomplete bundle branch block     Osteoarthritis     Pulmonary heart disease, chronic        Past Surgical History:   Procedure Laterality Date    APPENDECTOMY      CHOLECYSTECTOMY      COLONOSCOPY      TONSILLECTOMY         Time Tracking:     PT Received On: 20  PT Start Time: 926     PT Stop Time: 943  PT Total Time (min): 17 min     Billable Minutes: Evaluation 7 mins  and Gait Training 10 mins       Elvira Shaikh, PT  2020

## 2020-08-24 NOTE — PLAN OF CARE
08/24/20 1006   Post-Acute Status   Post-Acute Authorization Placement   Post-Acute Placement Status Referrals Sent       SW sent referrals via  for pt SNF placement. Awaiting facility decision.    Eli Suárez LMSW  Case Management Social Worker   [facility] [address]

## 2020-08-24 NOTE — PLAN OF CARE
SW spoke with pt daughter. She stated she wants pt H with HH. They do not want him to go to a skilled nursing facility. She stated her callback number is .     Attending advised of family request.     Eli Suárez LMSW  Case Management Social Worker   [facility] [address]

## 2020-08-24 NOTE — PLAN OF CARE
Problem: Adult Inpatient Plan of Care  Goal: Plan of Care Review  Outcome: Ongoing, Progressing  Goal: Patient-Specific Goal (Individualization)  Outcome: Ongoing, Progressing  Goal: Absence of Hospital-Acquired Illness or Injury  Outcome: Ongoing, Progressing  Goal: Optimal Comfort and Wellbeing  Outcome: Ongoing, Progressing  Goal: Readiness for Transition of Care  Outcome: Ongoing, Progressing  Goal: Rounds/Family Conference  Outcome: Ongoing, Progressing     Problem: Fall Injury Risk  Goal: Absence of Fall and Fall-Related Injury  Outcome: Ongoing, Progressing     Problem: Fever  Goal: Body Temperature in Desired Range  Outcome: Ongoing, Progressing     Problem: Skin Injury Risk Increased  Goal: Skin Health and Integrity  Outcome: Ongoing, Progressing

## 2020-08-24 NOTE — PLAN OF CARE
SW contacted pt son via telephone listed in demographics. Spoke with pt DIL. Stated that pt daughters are responsible for planning and that she will give message to have them contact SW back.     KORTNEY will send out referrals for pt placement post d/c.     Eli Suárez LMSW  Case Management Social Worker   [facility] [address]

## 2020-08-24 NOTE — CARE UPDATE
Attempted to call pts son Rhett and daughter Tasha via listed [employer] numbers, went to voicemail or continued ringing.     2nd attempt this admit

## 2020-08-24 NOTE — PT/OT/SLP EVAL
Occupational Therapy   Co-Evaluation    Name: Pinky Rivera  MRN: [id number]  Admitting Diagnosis:  Pneumonia due to COVID-19 virus     * No surgery found *    Recommendations:     Discharge Recommendations: home health OT  Discharge Equipment Recommendations:  none  Barriers to discharge:  None    Assessment:     Pinky Rivera is a 89 y.o. male with a medical diagnosis of Pneumonia due to COVID-19 virus.  He presents with a decline in occupational participation and functional mobility following hospitalization. Performance deficits affecting function: weakness, impaired endurance, impaired self care skills, impaired functional mobilty, gait instability, impaired balance, impaired cardiopulmonary response to activity.      Rehab Prognosis: Good; patient would benefit from acute skilled OT services to address these deficits and reach maximum level of function.       Plan:     Patient to be seen 3 x/week to address the above listed problems via self-care/home management, therapeutic activities, therapeutic exercises  · Plan of Care Expires: 09/24/20  · Plan of Care Reviewed with: patient    Subjective     Chief Complaint: Pain in L knee  Patient/Family Comments/goals: To feel better and return home    Occupational Profile:  Living Environment: Patient lives with his wife in a [facility] with a threshold to enter. He has both a tub/shower combination and WIS but reports using the WIS.   Previous level of function: Independent with ADLs, mod I with functional mobility (mostly uses SPC, occasionally uses RW). Per RN, patient's children bring them food.  Roles and Routines: , father, grandfather. 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   Equipment Used at Home:  cane, straight, grab bar, shower chair, walker, rolling  Assistance upon Discharge: Family available to assist    Pain/Comfort:  · Pain Rating 1: (Did not rate)  · Location - Side 1: Left  · Location 1:  knee  · Pain Addressed 1: Cessation of Activity, Distraction    Patients cultural, spiritual, Bahai conflicts given the current situation: no    Objective:     Communicated with: RN prior to session.  Patient found HOB elevated with oxygen, pulse ox (continuous), telemetry(hep lock IV) upon OT entry to room.    General Precautions: Standard, airborne, droplet, fall, contact   Orthopedic Precautions:N/A   Braces: N/A     Occupational Performance:    Bed Mobility:    · Patient completed Supine to Sit to R side EOB with stand by assistance  · Patient completed Scooting anteriorly to EOB for foot placement on floor with stand by assistance    Functional Mobility/Transfers:  · Patient completed Sit <> Stand Transfer with contact guard assistance with rolling walker   · Functional Mobility: ~25' with CGA and RW    Activities of Daily Living:  · Grooming: set-up assistance Patient participated in a face wash while sitting up in the chair with set-up assist.    Cognitive/Visual Perceptual:  Cognitive/Psychosocial Skills:     -       Oriented to: Person, Place, Time and Situation   -       Follows Commands/attention:Follows multistep  commands    Physical Exam:  Upper Extremity Range of Motion:     -       Right Upper Extremity: WFL  -       Left Upper Extremity: WFL  Upper Extremity Strength:    -       Right Upper Extremity: WFL  -       Left Upper Extremity: WFL    AMPAC 6 Click ADL:  AMPAC Total Score: 19    Treatment & Education:  Role of OT/evaluation  Educated on importance of sitting UIC/OOB activity with staff assistance while in acute setting to prevent further debility  Call button for assistance  Education:    Patient left up in chair with all lines intact, call button in reach and RN notified    GOALS:   Multidisciplinary Problems     Occupational Therapy Goals        Problem: Occupational Therapy Goal    Goal Priority Disciplines Outcome Interventions   Occupational Therapy Goal     OT, PT/OT Ongoing,  Progressing    Description: Goals to be met by: 9/7/2020     Patient will increase functional independence with ADLs by performing:    UE Dressing with Sierra.  LE Dressing with Minimal Assistance.  Grooming while standing with Stand-by Assistance.  Toileting from toilet with Minimal Assistance for hygiene and clothing management.   Toilet transfer to toilet with Stand-by Assistance.                     History:     Past Medical History:   Diagnosis Date    Bladder stone     Blind right eye     detached retina    BPH (benign prostatic hypertrophy)     Carotid artery disease     CHF (congestive heart failure)     [place] (hard of hearing)     Hyperlipidemia     Hypertension     Incomplete bundle branch block     Osteoarthritis     Pulmonary heart disease, chronic        Past Surgical History:   Procedure Laterality Date    APPENDECTOMY      CHOLECYSTECTOMY      COLONOSCOPY      TONSILLECTOMY         Time Tracking:     OT Date of Treatment: 08/24/20  OT Start Time: 0926  OT Stop Time: 0945  OT Total Time (min): 19 min    Billable Minutes:Evaluation 10 minutes  Therapeutic Activity 9 minutes    Juliette Manzo OT  8/24/2020

## 2020-08-24 NOTE — PLAN OF CARE
Discharge assessment information obtained from patient's daughter, Chloé Marion, by phone.   Patient lives with his wife in a 1-story home with one step to entrance.   Patient has nine adult children who assist with their needs.   Patient has been having difficulty rising from a sitting position and family is requesting a recliner chair with a lift.   Patient has not used home health in the past but would prefer [facility] if home health recommended.       08/24/20 1115   Discharge Assessment   Assessment Type Discharge Planning Assessment   Confirmed/corrected address and phone number on facesheet? Yes   Assessment information obtained from? Other  (Chloé Marion (daughter))   Prior to hospitilization cognitive status: Alert/Oriented   Prior to hospitalization functional status: Assistive Equipment;Needs Assistance   Current cognitive status: Alert/Oriented   Current Functional Status: Assistive Equipment;Needs Assistance   Facility Arrived From: home   Lives With spouse   Able to Return to Prior Arrangements yes   Is patient able to care for self after discharge? Unable to determine at this time (comments)   Who are your caregiver(s) and their phone number(s)? spouse and adult children   Patient's perception of discharge disposition home health   Readmission Within the Last 30 Days no previous admission in last 30 days   Patient currently being followed by outpatient case management? No   Patient currently receives any other outside agency services? No   Equipment Currently Used at Home rollator   Do you have any problems affording any of your prescribed medications? No   Is the patient taking medications as prescribed? yes   Does the patient have transportation home? Yes   Transportation Anticipated family or friend will provide   Does the patient receive services at the [facility]? No   Discharge Plan A Skilled Nursing Facility   Discharge Plan B Home with family;Home Health   DME Needed Upon Discharge     (TBD)       [id number] [place] - [address]  [address]  [place] [address]  Phone: [phone] Fax: [phone]  Extended Emergency Contact Information  Primary Emergency Contact: Tasha Simms   [facility]  Home Phone: [phone]  Relation: Daughter  Secondary Emergency Contact: TIERRA KAN  Home Phone: [phone]  Mobile Phone: [phone]  Relation: Son  Preferred language: English   needed? No

## 2020-08-25 PROBLEM — Z51.5 PALLIATIVE CARE ENCOUNTER: Status: ACTIVE | Noted: 2020-08-25

## 2020-08-25 LAB
ALBUMIN SERPL BCP-MCNC: 2.9 G/DL (ref 3.5–5.2)
ALLENS TEST: ABNORMAL
ALP SERPL-CCNC: 62 U/L (ref 55–135)
ALT SERPL W/O P-5'-P-CCNC: 37 U/L (ref 10–44)
ANION GAP SERPL CALC-SCNC: 8 MMOL/L (ref 8–16)
AST SERPL-CCNC: 45 U/L (ref 10–40)
BASOPHILS # BLD AUTO: 0.01 K/UL (ref 0–0.2)
BASOPHILS NFR BLD: 0.1 % (ref 0–1.9)
BILIRUB SERPL-MCNC: 0.5 MG/DL (ref 0.1–1)
BNP SERPL-MCNC: 58 PG/ML (ref 0–99)
BUN SERPL-MCNC: 27 MG/DL (ref 8–23)
CALCIUM SERPL-MCNC: 8.3 MG/DL (ref 8.7–10.5)
CHLORIDE SERPL-SCNC: 104 MMOL/L (ref 95–110)
CK SERPL-CCNC: 65 U/L (ref 20–200)
CO2 SERPL-SCNC: 27 MMOL/L (ref 23–29)
CREAT SERPL-MCNC: 0.9 MG/DL (ref 0.5–1.4)
CRP SERPL-MCNC: 36.6 MG/L (ref 0–8.2)
D DIMER PPP IA.FEU-MCNC: 4.92 MG/L FEU
DELSYS: ABNORMAL
DIFFERENTIAL METHOD: ABNORMAL
EOSINOPHIL # BLD AUTO: 0 K/UL (ref 0–0.5)
EOSINOPHIL NFR BLD: 0 % (ref 0–8)
ERYTHROCYTE [DISTWIDTH] IN BLOOD BY AUTOMATED COUNT: 13.8 % (ref 11.5–14.5)
ERYTHROCYTE [SEDIMENTATION RATE] IN BLOOD BY WESTERGREN METHOD: 60 MM/HR (ref 0–23)
EST. GFR  (AFRICAN AMERICAN): >60 ML/MIN/1.73 M^2
EST. GFR  (NON AFRICAN AMERICAN): >60 ML/MIN/1.73 M^2
FERRITIN SERPL-MCNC: 466 NG/ML (ref 20–300)
FLOW: 10
GLUCOSE SERPL-MCNC: 113 MG/DL (ref 70–110)
HCO3 UR-SCNC: 22 MMOL/L (ref 24–28)
HCT VFR BLD AUTO: 47.4 % (ref 40–54)
HGB BLD-MCNC: 14.6 G/DL (ref 14–18)
IMM GRANULOCYTES # BLD AUTO: 0.07 K/UL (ref 0–0.04)
IMM GRANULOCYTES NFR BLD AUTO: 0.9 % (ref 0–0.5)
LDH SERPL L TO P-CCNC: 305 U/L (ref 110–260)
LYMPHOCYTES # BLD AUTO: 0.3 K/UL (ref 1–4.8)
LYMPHOCYTES NFR BLD: 4.2 % (ref 18–48)
MAGNESIUM SERPL-MCNC: 2.3 MG/DL (ref 1.6–2.6)
MCH RBC QN AUTO: 30.5 PG (ref 27–31)
MCHC RBC AUTO-ENTMCNC: 30.8 G/DL (ref 32–36)
MCV RBC AUTO: 99 FL (ref 82–98)
MODE: ABNORMAL
MONOCYTES # BLD AUTO: 0.7 K/UL (ref 0.3–1)
MONOCYTES NFR BLD: 9.3 % (ref 4–15)
NEUTROPHILS # BLD AUTO: 6.6 K/UL (ref 1.8–7.7)
NEUTROPHILS NFR BLD: 85.5 % (ref 38–73)
NRBC BLD-RTO: 0 /100 WBC
PCO2 BLDA: 37.4 MMHG (ref 35–45)
PH SMN: 7.38 [PH] (ref 7.35–7.45)
PHOSPHATE SERPL-MCNC: 2.8 MG/DL (ref 2.7–4.5)
PLATELET # BLD AUTO: 202 K/UL (ref 150–350)
PMV BLD AUTO: 10.5 FL (ref 9.2–12.9)
PO2 BLDA: 80 MMHG (ref 80–100)
POC BE: -3 MMOL/L
POC SATURATED O2: 96 % (ref 95–100)
POC TCO2: 23 MMOL/L (ref 23–27)
POTASSIUM SERPL-SCNC: 4.8 MMOL/L (ref 3.5–5.1)
PROT SERPL-MCNC: 6.5 G/DL (ref 6–8.4)
RBC # BLD AUTO: 4.79 M/UL (ref 4.6–6.2)
SAMPLE: ABNORMAL
SITE: ABNORMAL
SODIUM SERPL-SCNC: 139 MMOL/L (ref 136–145)
SP02: 94
TROPONIN I SERPL DL<=0.01 NG/ML-MCNC: 0.03 NG/ML (ref 0–0.03)
WBC # BLD AUTO: 7.71 K/UL (ref 3.9–12.7)

## 2020-08-25 PROCEDURE — 25000003 PHARM REV CODE 250: Performed by: HOSPITALIST

## 2020-08-25 PROCEDURE — 85025 COMPLETE CBC W/AUTO DIFF WBC: CPT

## 2020-08-25 PROCEDURE — 99223 1ST HOSP IP/OBS HIGH 75: CPT | Mod: ,,, | Performed by: INTERNAL MEDICINE

## 2020-08-25 PROCEDURE — 36415 COLL VENOUS BLD VENIPUNCTURE: CPT

## 2020-08-25 PROCEDURE — 94640 AIRWAY INHALATION TREATMENT: CPT

## 2020-08-25 PROCEDURE — 82550 ASSAY OF CK (CPK): CPT

## 2020-08-25 PROCEDURE — 94761 N-INVAS EAR/PLS OXIMETRY MLT: CPT

## 2020-08-25 PROCEDURE — 82728 ASSAY OF FERRITIN: CPT

## 2020-08-25 PROCEDURE — 82803 BLOOD GASES ANY COMBINATION: CPT

## 2020-08-25 PROCEDURE — 83615 LACTATE (LD) (LDH) ENZYME: CPT

## 2020-08-25 PROCEDURE — 84100 ASSAY OF PHOSPHORUS: CPT

## 2020-08-25 PROCEDURE — 99233 PR SUBSEQUENT HOSPITAL CARE,LEVL III: ICD-10-PCS | Mod: ,,, | Performed by: HOSPITALIST

## 2020-08-25 PROCEDURE — 85652 RBC SED RATE AUTOMATED: CPT

## 2020-08-25 PROCEDURE — 27000221 HC OXYGEN, UP TO 24 HOURS

## 2020-08-25 PROCEDURE — 80053 COMPREHEN METABOLIC PANEL: CPT

## 2020-08-25 PROCEDURE — 83735 ASSAY OF MAGNESIUM: CPT

## 2020-08-25 PROCEDURE — 93005 ELECTROCARDIOGRAM TRACING: CPT

## 2020-08-25 PROCEDURE — 83880 ASSAY OF NATRIURETIC PEPTIDE: CPT

## 2020-08-25 PROCEDURE — 25000242 PHARM REV CODE 250 ALT 637 W/ HCPCS: Performed by: HOSPITALIST

## 2020-08-25 PROCEDURE — 25500020 PHARM REV CODE 255: Performed by: HOSPITALIST

## 2020-08-25 PROCEDURE — 99900035 HC TECH TIME PER 15 MIN (STAT)

## 2020-08-25 PROCEDURE — 84484 ASSAY OF TROPONIN QUANT: CPT

## 2020-08-25 PROCEDURE — 93010 ELECTROCARDIOGRAM REPORT: CPT | Mod: ,,, | Performed by: INTERNAL MEDICINE

## 2020-08-25 PROCEDURE — 63600175 PHARM REV CODE 636 W HCPCS: Performed by: HOSPITALIST

## 2020-08-25 PROCEDURE — 36600 WITHDRAWAL OF ARTERIAL BLOOD: CPT

## 2020-08-25 PROCEDURE — 99233 SBSQ HOSP IP/OBS HIGH 50: CPT | Mod: ,,, | Performed by: HOSPITALIST

## 2020-08-25 PROCEDURE — 86140 C-REACTIVE PROTEIN: CPT

## 2020-08-25 PROCEDURE — 99223 PR INITIAL HOSPITAL CARE,LEVL III: ICD-10-PCS | Mod: ,,, | Performed by: INTERNAL MEDICINE

## 2020-08-25 PROCEDURE — 93010 EKG 12-LEAD: ICD-10-PCS | Mod: ,,, | Performed by: INTERNAL MEDICINE

## 2020-08-25 PROCEDURE — 11000001 HC ACUTE MED/SURG PRIVATE ROOM

## 2020-08-25 PROCEDURE — 85379 FIBRIN DEGRADATION QUANT: CPT

## 2020-08-25 PROCEDURE — 63700000 PHARM REV CODE 250 ALT 637 W/O HCPCS: Performed by: HOSPITALIST

## 2020-08-25 RX ORDER — ENOXAPARIN SODIUM 100 MG/ML
90 INJECTION SUBCUTANEOUS EVERY 12 HOURS
Status: DISCONTINUED | OUTPATIENT
Start: 2020-08-25 | End: 2020-08-26

## 2020-08-25 RX ORDER — HYDRALAZINE HYDROCHLORIDE 20 MG/ML
10 INJECTION INTRAMUSCULAR; INTRAVENOUS EVERY 6 HOURS PRN
Status: DISCONTINUED | OUTPATIENT
Start: 2020-08-25 | End: 2020-08-28 | Stop reason: HOSPADM

## 2020-08-25 RX ORDER — IPRATROPIUM BROMIDE AND ALBUTEROL SULFATE 2.5; .5 MG/3ML; MG/3ML
3 SOLUTION RESPIRATORY (INHALATION)
Status: DISCONTINUED | OUTPATIENT
Start: 2020-08-25 | End: 2020-08-27

## 2020-08-25 RX ADMIN — THERA TABS 1 TABLET: TAB at 08:08

## 2020-08-25 RX ADMIN — IOHEXOL 100 ML: 350 INJECTION, SOLUTION INTRAVENOUS at 05:08

## 2020-08-25 RX ADMIN — ALBUTEROL SULFATE 2 PUFF: 90 AEROSOL, METERED RESPIRATORY (INHALATION) at 07:08

## 2020-08-25 RX ADMIN — ALBUTEROL SULFATE 2 PUFF: 90 AEROSOL, METERED RESPIRATORY (INHALATION) at 12:08

## 2020-08-25 RX ADMIN — REMDESIVIR 100 MG: 100 INJECTION, POWDER, LYOPHILIZED, FOR SOLUTION INTRAVENOUS at 03:08

## 2020-08-25 RX ADMIN — Medication 500 MG: at 08:08

## 2020-08-25 RX ADMIN — DEXAMETHASONE 6 MG: 4 TABLET ORAL at 08:08

## 2020-08-25 RX ADMIN — CHOLECALCIFEROL (VITAMIN D3) 25 MCG (1,000 UNIT) TABLET 1000 UNITS: at 08:08

## 2020-08-25 RX ADMIN — IPRATROPIUM BROMIDE AND ALBUTEROL SULFATE 3 ML: .5; 2.5 SOLUTION RESPIRATORY (INHALATION) at 01:08

## 2020-08-25 RX ADMIN — TAMSULOSIN HYDROCHLORIDE 0.4 MG: 0.4 CAPSULE ORAL at 10:08

## 2020-08-25 RX ADMIN — LOSARTAN POTASSIUM 50 MG: 25 TABLET, FILM COATED ORAL at 08:08

## 2020-08-25 RX ADMIN — ENOXAPARIN SODIUM 90 MG: 40 INJECTION SUBCUTANEOUS at 08:08

## 2020-08-25 RX ADMIN — PANTOPRAZOLE SODIUM 40 MG: 40 TABLET, DELAYED RELEASE ORAL at 08:08

## 2020-08-25 RX ADMIN — ASPIRIN 81 MG: 81 TABLET, COATED ORAL at 08:08

## 2020-08-25 RX ADMIN — AZITHROMYCIN 250 MG: 250 TABLET, FILM COATED ORAL at 08:08

## 2020-08-25 RX ADMIN — IPRATROPIUM BROMIDE AND ALBUTEROL SULFATE 3 ML: .5; 2.5 SOLUTION RESPIRATORY (INHALATION) at 07:08

## 2020-08-25 RX ADMIN — ENOXAPARIN SODIUM 90 MG: 40 INJECTION SUBCUTANEOUS at 11:08

## 2020-08-25 RX ADMIN — CEFTRIAXONE SODIUM 1 G: 1 INJECTION, POWDER, FOR SOLUTION INTRAMUSCULAR; INTRAVENOUS at 08:08

## 2020-08-25 RX ADMIN — FINASTERIDE 5 MG: 5 TABLET, FILM COATED ORAL at 10:08

## 2020-08-25 RX ADMIN — IPRATROPIUM BROMIDE AND ALBUTEROL SULFATE 3 ML: .5; 2.5 SOLUTION RESPIRATORY (INHALATION) at 03:08

## 2020-08-25 NOTE — PROGRESS NOTES
[facility] - ICU 14 [facility] Medicine  Progress Note    Patient Name: Pinky Rivera  MRN: [id number]  Patient Class: IP- Inpatient   Admission Date: 8/22/2020  Length of Stay: 2 days  Attending Physician: Curry Long MD  Primary Care Provider: Umair Cevallos MD        Subjective:     Principal Problem:Pneumonia due to COVID-19 virus    Interval History:  Patient asking when he can go home    Today is on 4L     CM spoke with daughter, requesting pt d/c Home with HOme health when ready     Appetite good, had a BM after getting PO lactulose overnight. Abdomen less distended soft     Review of Systems   Constitutional: Positive for appetite change and fatigue.   Respiratory: Positive for cough and shortness of breath.    Cardiovascular: Negative for chest pain and leg swelling.   Gastrointestinal: Negative for abdominal distention, abdominal pain and diarrhea.     Objective:     Vital Signs (Most Recent):  Temp: 98.5 °F (36.9 °C) (08/24/20 1604)  Pulse: 87 (08/24/20 1917)  Resp: 20 (08/24/20 1908)  BP: (!) 144/67 (08/24/20 1604)  SpO2: (!) 89 % (08/24/20 1917) Vital Signs (24h Range):  Temp:  [96.9 °F (36.1 °C)-98.5 °F (36.9 °C)] 98.5 °F (36.9 °C)  Pulse:  [] 87  Resp:  [18-28] 20  SpO2:  [87 %-94 %] 89 %  BP: (133-174)/(66-98) 144/67     Weight: 94 kg (207 lb 2 oz)  Body mass index is 32.44 kg/m².    Intake/Output Summary (Last 24 hours) at 8/24/2020 2000  Last data filed at 8/24/2020 1600  Gross per 24 hour   Intake 480 ml   Output 640 ml   Net -160 ml      Physical Exam  Constitutional:       Appearance: He is obese. He is ill-appearing.   Eyes:      General: No scleral icterus.  Cardiovascular:      Rate and Rhythm: Normal rate and regular rhythm.   Pulmonary:      Effort: Pulmonary effort is normal.      Breath sounds: Rales present. No wheezing.   Abdominal:      General: Abdomen is flat. Bowel sounds are normal.      Palpations: Abdomen is soft.   Musculoskeletal:       Comments: Feet cool to touch, left 1+,  Right unable to palp dp pulse,   Skin:     General: Skin is warm and dry.   Neurological:      General: No focal deficit present.      Mental Status: He is alert and oriented to person, place, and time.               Significant Labs:   CBC:   Recent Labs   Lab 08/23/20  0309 08/24/20  0303   WBC 9.37 9.26   HGB 14.1 14.4   HCT 45.1 46.2    173     CMP:   Recent Labs   Lab 08/23/20  0309 08/24/20  0303    139   K 5.0 4.9    106   CO2 26 21*   * 123*   BUN 28* 30*   CREATININE 1.1 1.2   CALCIUM 8.8 8.3*   PROT 6.6 6.5   ALBUMIN 2.8* 2.9*   BILITOT 0.3 0.4   ALKPHOS 53* 60   AST 26 29   ALT 13 19   ANIONGAP 9 12   EGFRNONAA 58.8* 52.9*     Recent Labs     08/22/20  0509 08/23/20  0309   FERRITIN  --  456*   CRP  --  62.1*   LDH  --  275*   TROPONINI 0.074*  --    * 149         All pertinent labs within the past 24 hours have been reviewed.    Significant Imaging: I have reviewed all pertinent imaging results/findings within the past 24 hours.    Assessment/Plan:      COVID-19 Virus Infection  Viral Pneumonia due to COVID-19  - COVID-19 testing: Positive on 8/21/20  - Isolation: Airborne/Droplet. Surgical mask on patient. Notify Infection Control  - Diagnostics: >blood cultures, flu swab, strep A screen negative   -D-dimer elevated, CRP downtrending/CK downtrending, LDH stable  -continue dexamethasone &  remdesivir (D3/5)  -patient is on 4L  -Notes decreased exercise tolerance  -supplements - ascorbic acid_vitamin D_MVI  -empiric abx - CTX/Azithro_D4/5    Acute Hypoxemic Respiratory Failure  -continue dexamethasone 6 mg daily   -PPI while on steroid   -pharmacy consult for Remdesivir     # HTN   -stable and continue home dose losartan      #CKD III   -monitor closely and avoid nephrotoxic agents   -Cr in normal range.      #BPH  -no acute issue   -continue home dose flomax and proscar      #Chronic venous stasis of legs   -no edema on exam   -hold  lasix given decreased PO intake   -feet cold to touch, rn notes DP pulses are dopplerable      # mild elevated troponin   -troponin 0.09 due to COVID and in setting of CKD   -no chest pain and EKG w/o ischemia   -trend shows flat to downtrending.   -continue aspirin     Constipation-slow transit  -started routine bowel regimen/senna-docusate, miralax  -s/p lactulose x 1  -Resolved with above regimen    Active Diagnoses:    Diagnosis Date Noted POA    PRINCIPAL PROBLEM:  Pneumonia due to COVID-19 virus [U07.1, J12.89] 08/22/2020 Yes    Acute hypoxemic respiratory failure [J96.01] 08/22/2020 Yes    Essential hypertension [I10]  Yes    CKD (chronic kidney disease), stage III [N18.3] 08/22/2020 Yes    BPH (benign prostatic hyperplasia) [N40.0] 08/22/2020 Yes    Elevated troponin [R79.89] 08/22/2020 Yes    Blindness of right eye [H54.40] 08/22/2020 Yes     Chronic      Problems Resolved During this Admission:     VTE Risk Mitigation (From admission, onward)         Ordered     enoxaparin injection 40 mg  Every 24 hours      08/22/20 1357     IP VTE HIGH RISK PATIENT  Once      08/22/20 0229     Place sequential compression device  Until discontinued      08/22/20 0229               High Risk Conditions:    Patient has a condition that poses threat to life and bodily function: Severe Respiratory Distress and COVID19 pneumonia    Patient is currently on drug therapy requiring intensive monitoring for toxicity: Katya Long MD  Department of Hospital Medicine   [facility] - ICU 14 [facility]

## 2020-08-25 NOTE — ASSESSMENT & PLAN NOTE
89 year old male with PMH HTN, CKDIII, and BPH who presents with acute hypoxic respiratory failure due to COVID 19       GOC/ACP   -Talked with patient and patient's son Rhett. Discussed diagnosis and unknown prognosis at this point. Discussed that while we are all hoping for the best and continuing to provide excellent care to get the patient through this illness we need to have a plan for if things do not go the way that we want them to.  Discussed that if patient were to decompensate to the point of needing to be intubated, I would worry that patient would not be able to recover meaningfully if at all. Patient and son expressed understanding. They will continue to discuss as a family but at this point both patient and family would like a time trial of intubation if necessary.   -Patient remains full code. Patient and family would like a time trial of intubation if necessary. They understand chance of meaningful recovery would be low if patient is intubated.

## 2020-08-25 NOTE — HPI
89 year old male with PMH HTN, CKDIII, and BPH presents with acute hypoxic respiratory failure due to COVID-19. Patient was initially stable on 4L NC with plans to discharge home with home health but O2 requirement increased to 15L HFNC overnight. Now back down to 8L HFNC. Palliative has been consulted to assist with GOC

## 2020-08-25 NOTE — PLAN OF CARE
Pt is not medically ready to discharge.    CM will follow-up and assist team with discharge needs.    Kimmie Baker RN   - [facility]  [phone]

## 2020-08-25 NOTE — PLAN OF CARE
08/25/20 0912   Post-Acute Status   Post-Acute Authorization Home Health   Post-Acute Placement Status Set-up Complete       SW sent referrals via  for pt placement HH. Awaiting facility decision.    Eli Suárez LMSW  Case Management Social Worker   [facility] [address]

## 2020-08-25 NOTE — NURSING
Pt is awake alert and oriented with no complain noted before or after shift change. Hand off given, Pt VS are stable, IV clean, dry, and intact. Pt is Aox4, bed in low position, call light with in reach. No other complaints noted. Pt has developed elevated pressure overnight; will receive pressure medication this morning after shift change, but will need something for his pressure at night

## 2020-08-25 NOTE — CARE UPDATE
"RAPID RESPONSE NURSE PROACTIVE ROUNDING NOTE     Time of Visit: 1118    Admit Date: 2020  LOS: 3  Code Status: Full Code   Date of Visit: 2020  : 1930  Age: 89 y.o.  Sex: male  Race: White  Bed: 54573/34839 A:   MRN: [id number]  Was the patient discharged from an ICU this admission? no   Was the patient discharged from a PACU within last 24 hours?  no  Did the patient receive conscious sedation/general anesthesia in last 24 hours?  no  Was the patient in the ED within the past 24 hours?  no  Was the patient started on NIPPV within the past 24 hours?  no  Attending Physician: Curry Long MD  Primary Service: Networked reference to record PCT     ASSESSMENT     Notified by charge RN during rounding.  Reason for alert: increased O2 overnight     Diagnosis: Pneumonia due to COVID-19 virus    Abnormal Vital Signs: BP (!) 190/85 (BP Location: Left arm, Patient Position: Lying)   Pulse (!) 58   Temp 97.8 °F (36.6 °C) (Axillary)   Resp (!) 24   Ht 5' 7" (1.702 m)   Wt 94 kg (207 lb 2 oz)   SpO2 (!) 94%   BMI 32.44 kg/m²      Clinical Issues: Respiratory    Patient  has a past medical history of Bladder stone, Blind right eye, BPH (benign prostatic hypertrophy), Carotid artery disease, CHF (congestive heart failure), [place] (hard of hearing), Hyperlipidemia, Hypertension, Incomplete bundle branch block, Osteoarthritis, and Pulmonary heart disease, chronic.      Patient seen on virtual proactive round via eICU.   Patient was asleep, resting comfortably, mildly elevated RR ~20s.   SpO2 94% on 8L HFNC.   Required increase to 15L overnight from 2L, have been slowly able to wean since then.   CXR obtained today shows no change.   VSS, HDS at this time.      INTERVENTIONS/ RECOMMENDATIONS     Continue monitoring per unit protocol.    Discussed plan of care with JESSE Aldridge.    PHYSICIAN ESCALATION     Yes/No  no    Orders received and case discussed with NA.    Disposition: Remain in room " [id number].    FOLLOW-UP     Call back the Rapid Response Nurse, Tabitha Edmonds RN at [phone] for additional questions or concerns.

## 2020-08-25 NOTE — PROGRESS NOTES
[facility] - ICU 14 [facility] Medicine  Progress Note    Patient Name: Pinky Rviera  MRN: [id number]  Patient Class: IP- Inpatient   Admission Date: 8/22/2020  Length of Stay: 3 days  Attending Physician: Curry Long MD  Primary Care Provider: Umair Cevallos MD        Subjective:     Principal Problem:Pneumonia due to COVID-19 virus    Interval History:  Overnight patient O2 requirement has risen this AM noted pt was on 15L high flow nasal cannul, no acute notes describing any acute changes in his clinical status.     This morning ABG_EKG_Trop_BNP_CXR ordered, family called and notified daughter Chloé Marion of this status change.  I was unable to reach family over the weekend, she reports cell reception at parents house is poor, likely the cause.     Informed her of this new status change and need to evaluate for worsening disease or alternative cause, I have increased him to therapeutic anticoagulation and will order a CTA as well to rule out PE, his D-dimer has been stable but elevated since admit.     Pts greatest concern this morning is regarding when he can discharge.  Informed him at these oxygen levels is likely looking at more days in the hospital.  Pt is frustrated by this news.     In afternoon dick Luevano, her  is an RN and was on the call as well.  With addition of nebulizers for wheezing his O2 requirement is improving.     Review of Systems   Constitutional: Positive for appetite change and fatigue.   Respiratory: Positive for cough and shortness of breath.    Cardiovascular: Negative for chest pain and leg swelling.   Gastrointestinal: Negative for abdominal distention, abdominal pain and diarrhea.     Objective:     Vital Signs (Most Recent):  Temp: 97.8 °F (36.6 °C) (08/25/20 1217)  Pulse: 64 (08/25/20 1533)  Resp: (!) 24 (08/25/20 1533)  BP: (!) 158/70 (08/25/20 1217)  SpO2: (!) 90 % (08/25/20 1533) Vital Signs (24h Range):  Temp:  [97.6 °F (36.4 °C)-98.1 °F  (36.7 °C)] 97.8 °F (36.6 °C)  Pulse:  [54-88] 64  Resp:  [18-28] 24  SpO2:  [88 %-96 %] 90 %  BP: (152-190)/(70-85) 158/70     Weight: 94 kg (207 lb 2 oz)  Body mass index is 32.44 kg/m².    Intake/Output Summary (Last 24 hours) at 8/25/2020 1807  Last data filed at 8/25/2020 1000  Gross per 24 hour   Intake 565 ml   Output 770 ml   Net -205 ml      Physical Exam  Constitutional:       Appearance: He is obese. He is ill-appearing.   Eyes:      General: No scleral icterus.  Cardiovascular:      Rate and Rhythm: Normal rate and regular rhythm.   Pulmonary:      Effort: Pulmonary effort is normal.      Breath sounds: Wheezing and rales present.      Comments: End expiratory wheezing noted  Abdominal:      General: Abdomen is flat. Bowel sounds are normal.      Palpations: Abdomen is soft.   Musculoskeletal:      Comments: Feet cool to touch, left 1+,  Right unable to palp dp pulse,   Skin:     General: Skin is warm and dry.   Neurological:      General: No focal deficit present.      Mental Status: He is alert and oriented to person, place, and time.               Significant Labs:   CBC:   Recent Labs   Lab 08/24/20 0303 08/25/20 0426   WBC 9.26 7.71   HGB 14.4 14.6   HCT 46.2 47.4    202     CMP:   Recent Labs   Lab 08/24/20 0303 08/25/20 0425    139   K 4.9 4.8    104   CO2 21* 27   * 113*   BUN 30* 27*   CREATININE 1.2 0.9   CALCIUM 8.3* 8.3*   PROT 6.5 6.5   ALBUMIN 2.9* 2.9*   BILITOT 0.4 0.5   ALKPHOS 60 62   AST 29 45*   ALT 19 37   ANIONGAP 12 8   EGFRNONAA 52.9* >60.0     Recent Labs     08/23/20  0309 08/25/20  0425 08/25/20  0426 08/25/20  0946   DDIMER  --   --  4.92*  --    FERRITIN 456* 466*  --   --    CRP 62.1* 36.6*  --   --    * 305*  --   --    BNP  --   --   --  58   TROPONINI  --   --   --  0.032*    65  --   --          All pertinent labs within the past 24 hours have been reviewed.    Significant Imaging: I have reviewed all pertinent imaging  results/findings within the past 24 hours.    Assessment/Plan:      COVID-19 Virus Infection  Viral Pneumonia due to COVID-19  - COVID-19 testing: Positive on 8/21/20  - Isolation: Airborne/Droplet. Surgical mask on patient. Notify Infection Control  - Diagnostics: >blood cultures, flu swab, strep A screen negative   -D-dimer elevated, CRP downtrending/CK downtrending, LDH stable  -continue dexamethasone &  remdesivir (D4/5)    -Notes decreased exercise tolerance  -supplements - ascorbic acid_vitamin D_MVI  -empiric abx - CTX/Azithro_D4/5    Acute Hypoxemic Respiratory Failure  -continue dexamethasone 6 mg daily   -Patient on 15L this morning and added Nebulizers.    >EKG, CXR, BNP, Troponin are stable   >CTA Chest is pending.   -PPI while on steroid   -pharmacy consult for Remdesivir     # HTN   -stable and continue home dose losartan      #CKD III   -monitor closely and avoid nephrotoxic agents   -Cr in normal range.      #BPH  -no acute issue   -continue home dose flomax and proscar      #Chronic venous stasis of legs   -no edema on exam   -hold lasix given decreased PO intake   -feet cold to touch, rn notes DP pulses are dopplerable      # mild elevated troponin   -troponin 0.09 due to COVID and in setting of CKD   -no chest pain and EKG w/o ischemia   -trend shows flat to downtrending.   -continue aspirin     Constipation-slow transit  -started routine bowel regimen/senna-docusate, miralax  -s/p lactulose x 1  -Resolved with above regimen    Active Diagnoses:    Diagnosis Date Noted POA    PRINCIPAL PROBLEM:  Pneumonia due to COVID-19 virus [U07.1, J12.89] 08/22/2020 Yes    Acute hypoxemic respiratory failure [J96.01] 08/22/2020 Yes    Essential hypertension [I10]  Yes    CKD (chronic kidney disease), stage III [N18.3] 08/22/2020 Yes    BPH (benign prostatic hyperplasia) [N40.0] 08/22/2020 Yes    Elevated troponin [R79.89] 08/22/2020 Yes    Palliative care encounter [Z51.5] 08/25/2020 Not Applicable     Blindness of right eye [H54.40] 08/22/2020 Yes     Chronic      Problems Resolved During this Admission:     VTE Risk Mitigation (From admission, onward)         Ordered     enoxaparin injection 90 mg  Every 12 hours      08/25/20 1052     IP VTE HIGH RISK PATIENT  Once      08/22/20 0229     Place sequential compression device  Until discontinued      08/22/20 0229               High Risk Conditions:    Patient has a condition that poses threat to life and bodily function: Severe Respiratory Distress and COVID19 pneumonia    Patient is currently on drug therapy requiring intensive monitoring for toxicity: Remdesivir        Curry Long MD  Department of Hospital Medicine   [facility] - ICU 14 [facility]

## 2020-08-25 NOTE — CONSULTS
[facility] - ICU 14 WT  Palliative Medicine  Consult Note    Patient Name: Pinky Rivera  MRN: [id number]  Admission Date: 8/22/2020  Hospital Length of Stay: 3 days  Code Status: Full Code   Attending Provider: Curry Long MD  Consulting Provider: Mer Hodgson MD  Primary Care Physician: Umair Cevallos MD  Principal Problem:Pneumonia due to COVID-19 virus    Patient information was obtained from patient, relative(s) and ER records.      Inpatient consult to Palliative Care  Consult performed by: Mer Hodgson MD  Consult ordered by: Curry Long MD        Assessment/Plan:     Palliative care encounter  89 year old male with PMH HTN, CKDIII, and BPH who presents with acute hypoxic respiratory failure due to COVID 19       GOC/ACP   -Talked with patient and patient's son Rhett. Discussed diagnosis and unknown prognosis at this point. Discussed that while we are all hoping for the best and continuing to provide excellent care to get the patient through this illness we need to have a plan for if things do not go the way that we want them to.  Discussed that if patient were to decompensate to the point of needing to be intubated, I would worry that patient would not be able to recover meaningfully if at all. Patient and son expressed understanding. They will continue to discuss as a family but at this point both patient and family would like a time trial of intubation if necessary.   -Patient remains full code. Patient and family would like a time trial of intubation if necessary. They understand chance of meaningful recovery would be low if patient is intubated.     Shortness of breath   -Patient currently denies shortness of breath. Recommend fan to bedside and use of low dose opioids prn if shortness of breath worsens along with usual medical management         Thank you for your consult. I will follow-up with patient. Please contact us if you have any additional  "questions.    Subjective:     HPI:   89 year old male with PMH HTN, CKDIII, and BPH presents with acute hypoxic respiratory failure due to COVID-19. Patient was initially stable on 4L NC with plans to discharge home with home health but O2 requirement increased to 15L HFNC overnight. Now back down to 8L HFNC. Palliative has been consulted to assist with [facility]     Hospital Course:  No notes on file    Interval History: Patient denies shortness of breath or pain. Says "I'm as good as can be expected." He is bored with being in the hospital     Past Medical History:   Diagnosis Date    Bladder stone     Blind right eye     detached retina    BPH (benign prostatic hypertrophy)     Carotid artery disease     CHF (congestive heart failure)     [place] (hard of hearing)     Hyperlipidemia     Hypertension     Incomplete bundle branch block     Osteoarthritis     Pulmonary heart disease, chronic        Past Surgical History:   Procedure Laterality Date    APPENDECTOMY      CHOLECYSTECTOMY      COLONOSCOPY      TONSILLECTOMY         Review of patient's allergies indicates:  No Known Allergies    Medications:  Continuous Infusions:  Scheduled Meds:   albuterol-ipratropium  3 mL Nebulization Q4H WAKE    ascorbic acid (vitamin C)  500 mg Oral BID    aspirin  81 mg Oral QAM    cefTRIAXone (ROCEPHIN) IVPB  1 g Intravenous Q24H    dexAMETHasone  6 mg Oral Daily    enoxaparin  90 mg Subcutaneous Q12H    finasteride  5 mg Oral DAILY PC    losartan  50 mg Oral QAM    multivitamin  1 tablet Oral Daily    pantoprazole  40 mg Oral Daily    EUA remdesivir infusion  100 mg Intravenous Q24H    tamsulosin  0.4 mg Oral DAILY PC    vitamin D  1,000 Units Oral Daily     PRN Meds:acetaminophen, albuterol-ipratropium, dextromethorphan-guaifenesin  mg/5 ml, dextrose 50%, dextrose 50%, glucagon (human recombinant), glucose, glucose, hydrALAZINE, ondansetron, senna-docusate 8.6-50 mg, sodium chloride 0.9%    Family " History     Problem Relation (Age of Onset)    Asthma Mother    Heart disease Father    Hyperlipidemia Mother    Hypertension Mother        Tobacco Use    Smoking status: Never Smoker    Smokeless tobacco: Never Used   Substance and Sexual Activity    Alcohol use: No    Drug use: No    Sexual activity: Not Currently     Partners: Female       Review of Systems   Constitutional: Positive for fatigue.   HENT: Negative.    Eyes: Negative.    Respiratory: Positive for cough and shortness of breath.    Cardiovascular: Negative.    Gastrointestinal: Negative.    Musculoskeletal: Negative.    Skin: Negative.    Neurological: Negative.    Hematological: Negative.    Psychiatric/Behavioral: Negative.      Objective:     Vital Signs (Most Recent):  Temp: 97.8 °F (36.6 °C) (08/25/20 1217)  Pulse: (!) 56 (08/25/20 1313)  Resp: (!) 28 (08/25/20 1313)  BP: (!) 158/70 (08/25/20 1217)  SpO2: (!) 93 % (08/25/20 1313) Vital Signs (24h Range):  Temp:  [97.6 °F (36.4 °C)-98.5 °F (36.9 °C)] 97.8 °F (36.6 °C)  Pulse:  [54-88] 56  Resp:  [18-28] 28  SpO2:  [88 %-96 %] 93 %  BP: (144-190)/(67-85) 158/70     Weight: 94 kg (207 lb 2 oz)  Body mass index is 32.44 kg/m².    Physical Exam  Physical Exam deferred due to COVID 19     Advance Care Planning   Review of Symptoms    Symptom Assessment (ESAS 0-10 Scale)  Pain:  0  Dyspnea:  1  Anxiety:  0  Nausea:  0  Depression:  0  Anorexia:  0  Fatigue:  2  Insomnia:  0  Restlessness:  0  Agitation:  0     CAM / Delirium:  Negative  Constipation:  Negative  Diarrhea:  Negative and Positive          OME in 24 hours:  0    Performance Status:  90    Advanced Directives:  Living Will: No    LaPOST: No    Do Not Resuscitate Status: No    Medical Power of : patient's wife and 9 children make decisions together. Oleksandr Delaney [phone] can get in touch with everyone.      Decision Making:  Patient answered questions and Family answered questions    Living Arrangements:  Lives with  spouse    Psychosocial/Cultural:  Lives with spouse. Has 9 children. Stays active. Also enjoys reading and watching TV     Spiritual:  F - Faviola and Belief:  Jew  I - Importance:  Not addressed   C - Community:  Not addressed   A - Address in Care:  Not addressed          Significant Labs: All pertinent labs within the past 24 hours have been reviewed.  CBC:   Recent Labs   Lab 08/25/20 0426   WBC 7.71   HGB 14.6   HCT 47.4   MCV 99*        BMP:  Recent Labs   Lab 08/25/20 0425   *      K 4.8      CO2 27   BUN 27*   CREATININE 0.9   CALCIUM 8.3*   MG 2.3     LFT:  Lab Results   Component Value Date    AST 45 (H) 08/25/2020    ALKPHOS 62 08/25/2020    BILITOT 0.5 08/25/2020     Albumin:   Albumin   Date Value Ref Range Status   08/25/2020 2.9 (L) 3.5 - 5.2 g/dL Final     Protein:   Total Protein   Date Value Ref Range Status   08/25/2020 6.5 6.0 - 8.4 g/dL Final     Lactic acid:   Lab Results   Component Value Date    LACTATE 1.2 08/21/2020       Significant Imaging: I have reviewed all pertinent imaging results/findings within the past 24 hours.  None      > 50% of 75 min visit spent in chart review, face to face discussion of goals of care,  symptom assessment, coordination of care and emotional support.    Mer Hodgson MD  Palliative Medicine  [facility] - ICU 14 WT

## 2020-08-25 NOTE — PLAN OF CARE
Problem: Adult Inpatient Plan of Care  Goal: Plan of Care Review  Outcome: Ongoing, Progressing  Goal: Patient-Specific Goal (Individualization)  Outcome: Ongoing, Progressing  Goal: Absence of Hospital-Acquired Illness or Injury  Outcome: Ongoing, Progressing  Goal: Optimal Comfort and Wellbeing  Outcome: Ongoing, Progressing  Goal: Readiness for Transition of Care  Outcome: Ongoing, Progressing  Goal: Rounds/Family Conference  Outcome: Ongoing, Progressing     Problem: Fall Injury Risk  Goal: Absence of Fall and Fall-Related Injury  Outcome: Ongoing, Progressing     Problem: Fever  Goal: Body Temperature in Desired Range  Outcome: Ongoing, Progressing     Problem: Skin Injury Risk Increased  Goal: Skin Health and Integrity  Outcome: Ongoing, Progressing     Problem: Coping Ineffective  Goal: Effective Coping  Outcome: Ongoing, Progressing

## 2020-08-25 NOTE — SUBJECTIVE & OBJECTIVE
"Interval History: Patient denies shortness of breath or pain. Says "I'm as good as can be expected." He is bored with being in the hospital     Past Medical History:   Diagnosis Date    Bladder stone     Blind right eye     detached retina    BPH (benign prostatic hypertrophy)     Carotid artery disease     CHF (congestive heart failure)     [place] (hard of hearing)     Hyperlipidemia     Hypertension     Incomplete bundle branch block     Osteoarthritis     Pulmonary heart disease, chronic        Past Surgical History:   Procedure Laterality Date    APPENDECTOMY      CHOLECYSTECTOMY      COLONOSCOPY      TONSILLECTOMY         Review of patient's allergies indicates:  No Known Allergies    Medications:  Continuous Infusions:  Scheduled Meds:   albuterol-ipratropium  3 mL Nebulization Q4H WAKE    ascorbic acid (vitamin C)  500 mg Oral BID    aspirin  81 mg Oral QAM    cefTRIAXone (ROCEPHIN) IVPB  1 g Intravenous Q24H    dexAMETHasone  6 mg Oral Daily    enoxaparin  90 mg Subcutaneous Q12H    finasteride  5 mg Oral DAILY PC    losartan  50 mg Oral QAM    multivitamin  1 tablet Oral Daily    pantoprazole  40 mg Oral Daily    EUA remdesivir infusion  100 mg Intravenous Q24H    tamsulosin  0.4 mg Oral DAILY PC    vitamin D  1,000 Units Oral Daily     PRN Meds:acetaminophen, albuterol-ipratropium, dextromethorphan-guaifenesin  mg/5 ml, dextrose 50%, dextrose 50%, glucagon (human recombinant), glucose, glucose, hydrALAZINE, ondansetron, senna-docusate 8.6-50 mg, sodium chloride 0.9%    Family History     Problem Relation (Age of Onset)    Asthma Mother    Heart disease Father    Hyperlipidemia Mother    Hypertension Mother        Tobacco Use    Smoking status: Never Smoker    Smokeless tobacco: Never Used   Substance and Sexual Activity    Alcohol use: No    Drug use: No    Sexual activity: Not Currently     Partners: Female       Review of Systems   Constitutional: Positive for fatigue. "   HENT: Negative.    Eyes: Negative.    Respiratory: Positive for cough and shortness of breath.    Cardiovascular: Negative.    Gastrointestinal: Negative.    Musculoskeletal: Negative.    Skin: Negative.    Neurological: Negative.    Hematological: Negative.    Psychiatric/Behavioral: Negative.      Objective:     Vital Signs (Most Recent):  Temp: 97.8 °F (36.6 °C) (08/25/20 1217)  Pulse: (!) 56 (08/25/20 1313)  Resp: (!) 28 (08/25/20 1313)  BP: (!) 158/70 (08/25/20 1217)  SpO2: (!) 93 % (08/25/20 1313) Vital Signs (24h Range):  Temp:  [97.6 °F (36.4 °C)-98.5 °F (36.9 °C)] 97.8 °F (36.6 °C)  Pulse:  [54-88] 56  Resp:  [18-28] 28  SpO2:  [88 %-96 %] 93 %  BP: (144-190)/(67-85) 158/70     Weight: 94 kg (207 lb 2 oz)  Body mass index is 32.44 kg/m².    Physical Exam  Physical Exam deferred due to COVID 19     Advance Care Planning   Review of Symptoms    Symptom Assessment (ESAS 0-10 Scale)  Pain:  0  Dyspnea:  1  Anxiety:  0  Nausea:  0  Depression:  0  Anorexia:  0  Fatigue:  2  Insomnia:  0  Restlessness:  0  Agitation:  0     CAM / Delirium:  Negative  Constipation:  Negative  Diarrhea:  Negative and Positive          OME in 24 hours:  0    Performance Status:  90    Advanced Directives:  Living Will: No    LaPOST: No    Do Not Resuscitate Status: No    Medical Power of : patient's wife and 9 children make decisions together. Oleksandr Delaney [phone] can get in touch with everyone.      Decision Making:  Patient answered questions and Family answered questions    Living Arrangements:  Lives with spouse    Psychosocial/Cultural:  Lives with spouse. Has 9 children. Stays active. Also enjoys reading and watching TV     Spiritual:  F - Faviola and Belief:  Jehovah's witness  I - Importance:  Not addressed   C - Community:  Not addressed   A - Address in Care:  Not addressed          Significant Labs: All pertinent labs within the past 24 hours have been reviewed.  CBC:   Recent Labs   Lab 08/25/20  0426   WBC 7.71   HGB  14.6   HCT 47.4   MCV 99*        BMP:  Recent Labs   Lab 08/25/20  0425   *      K 4.8      CO2 27   BUN 27*   CREATININE 0.9   CALCIUM 8.3*   MG 2.3     LFT:  Lab Results   Component Value Date    AST 45 (H) 08/25/2020    ALKPHOS 62 08/25/2020    BILITOT 0.5 08/25/2020     Albumin:   Albumin   Date Value Ref Range Status   08/25/2020 2.9 (L) 3.5 - 5.2 g/dL Final     Protein:   Total Protein   Date Value Ref Range Status   08/25/2020 6.5 6.0 - 8.4 g/dL Final     Lactic acid:   Lab Results   Component Value Date    LACTATE 1.2 08/21/2020       Significant Imaging: I have reviewed all pertinent imaging results/findings within the past 24 hours.  None

## 2020-08-26 LAB
ALBUMIN SERPL BCP-MCNC: 2.7 G/DL (ref 3.5–5.2)
ALP SERPL-CCNC: 59 U/L (ref 55–135)
ALT SERPL W/O P-5'-P-CCNC: 30 U/L (ref 10–44)
ANION GAP SERPL CALC-SCNC: 7 MMOL/L (ref 8–16)
AST SERPL-CCNC: 31 U/L (ref 10–40)
BASOPHILS # BLD AUTO: 0.01 K/UL (ref 0–0.2)
BASOPHILS NFR BLD: 0.1 % (ref 0–1.9)
BILIRUB SERPL-MCNC: 0.4 MG/DL (ref 0.1–1)
BUN SERPL-MCNC: 24 MG/DL (ref 8–23)
CALCIUM SERPL-MCNC: 8.4 MG/DL (ref 8.7–10.5)
CHLORIDE SERPL-SCNC: 103 MMOL/L (ref 95–110)
CO2 SERPL-SCNC: 27 MMOL/L (ref 23–29)
CREAT SERPL-MCNC: 1.1 MG/DL (ref 0.5–1.4)
DIFFERENTIAL METHOD: ABNORMAL
EOSINOPHIL # BLD AUTO: 0 K/UL (ref 0–0.5)
EOSINOPHIL NFR BLD: 0 % (ref 0–8)
ERYTHROCYTE [DISTWIDTH] IN BLOOD BY AUTOMATED COUNT: 13.9 % (ref 11.5–14.5)
EST. GFR  (AFRICAN AMERICAN): >60 ML/MIN/1.73 M^2
EST. GFR  (NON AFRICAN AMERICAN): 58.8 ML/MIN/1.73 M^2
GLUCOSE SERPL-MCNC: 113 MG/DL (ref 70–110)
HCT VFR BLD AUTO: 45 % (ref 40–54)
HGB BLD-MCNC: 14 G/DL (ref 14–18)
IMM GRANULOCYTES # BLD AUTO: 0.09 K/UL (ref 0–0.04)
IMM GRANULOCYTES NFR BLD AUTO: 1.1 % (ref 0–0.5)
LYMPHOCYTES # BLD AUTO: 0.4 K/UL (ref 1–4.8)
LYMPHOCYTES NFR BLD: 4.1 % (ref 18–48)
MAGNESIUM SERPL-MCNC: 2.2 MG/DL (ref 1.6–2.6)
MCH RBC QN AUTO: 30.6 PG (ref 27–31)
MCHC RBC AUTO-ENTMCNC: 31.1 G/DL (ref 32–36)
MCV RBC AUTO: 99 FL (ref 82–98)
MONOCYTES # BLD AUTO: 0.6 K/UL (ref 0.3–1)
MONOCYTES NFR BLD: 7.2 % (ref 4–15)
NEUTROPHILS # BLD AUTO: 7.5 K/UL (ref 1.8–7.7)
NEUTROPHILS NFR BLD: 87.5 % (ref 38–73)
NRBC BLD-RTO: 0 /100 WBC
PHOSPHATE SERPL-MCNC: 2.8 MG/DL (ref 2.7–4.5)
PLATELET # BLD AUTO: 248 K/UL (ref 150–350)
PMV BLD AUTO: 9.7 FL (ref 9.2–12.9)
POTASSIUM SERPL-SCNC: 4.6 MMOL/L (ref 3.5–5.1)
PROT SERPL-MCNC: 6.2 G/DL (ref 6–8.4)
RBC # BLD AUTO: 4.57 M/UL (ref 4.6–6.2)
SODIUM SERPL-SCNC: 137 MMOL/L (ref 136–145)
WBC # BLD AUTO: 8.52 K/UL (ref 3.9–12.7)

## 2020-08-26 PROCEDURE — 25000003 PHARM REV CODE 250: Performed by: HOSPITALIST

## 2020-08-26 PROCEDURE — 11000001 HC ACUTE MED/SURG PRIVATE ROOM

## 2020-08-26 PROCEDURE — 99233 SBSQ HOSP IP/OBS HIGH 50: CPT | Mod: ,,, | Performed by: HOSPITALIST

## 2020-08-26 PROCEDURE — 84100 ASSAY OF PHOSPHORUS: CPT

## 2020-08-26 PROCEDURE — 83735 ASSAY OF MAGNESIUM: CPT

## 2020-08-26 PROCEDURE — 80053 COMPREHEN METABOLIC PANEL: CPT

## 2020-08-26 PROCEDURE — 63600175 PHARM REV CODE 636 W HCPCS: Performed by: HOSPITALIST

## 2020-08-26 PROCEDURE — 85025 COMPLETE CBC W/AUTO DIFF WBC: CPT

## 2020-08-26 PROCEDURE — 94761 N-INVAS EAR/PLS OXIMETRY MLT: CPT

## 2020-08-26 PROCEDURE — 97110 THERAPEUTIC EXERCISES: CPT

## 2020-08-26 PROCEDURE — 27000221 HC OXYGEN, UP TO 24 HOURS

## 2020-08-26 PROCEDURE — 36415 COLL VENOUS BLD VENIPUNCTURE: CPT

## 2020-08-26 PROCEDURE — 94640 AIRWAY INHALATION TREATMENT: CPT

## 2020-08-26 PROCEDURE — 97116 GAIT TRAINING THERAPY: CPT

## 2020-08-26 PROCEDURE — 99233 PR SUBSEQUENT HOSPITAL CARE,LEVL III: ICD-10-PCS | Mod: ,,, | Performed by: HOSPITALIST

## 2020-08-26 PROCEDURE — 25000242 PHARM REV CODE 250 ALT 637 W/ HCPCS: Performed by: HOSPITALIST

## 2020-08-26 RX ORDER — ENOXAPARIN SODIUM 100 MG/ML
40 INJECTION SUBCUTANEOUS EVERY 24 HOURS
Status: DISCONTINUED | OUTPATIENT
Start: 2020-08-27 | End: 2020-08-28 | Stop reason: HOSPADM

## 2020-08-26 RX ORDER — LOSARTAN POTASSIUM 25 MG/1
50 TABLET ORAL 2 TIMES DAILY
Status: DISCONTINUED | OUTPATIENT
Start: 2020-08-26 | End: 2020-08-28 | Stop reason: HOSPADM

## 2020-08-26 RX ADMIN — CHOLECALCIFEROL (VITAMIN D3) 25 MCG (1,000 UNIT) TABLET 1000 UNITS: at 08:08

## 2020-08-26 RX ADMIN — LOSARTAN POTASSIUM 50 MG: 25 TABLET, FILM COATED ORAL at 08:08

## 2020-08-26 RX ADMIN — Medication 500 MG: at 08:08

## 2020-08-26 RX ADMIN — ASPIRIN 81 MG: 81 TABLET, COATED ORAL at 08:08

## 2020-08-26 RX ADMIN — ENOXAPARIN SODIUM 90 MG: 40 INJECTION SUBCUTANEOUS at 08:08

## 2020-08-26 RX ADMIN — FINASTERIDE 5 MG: 5 TABLET, FILM COATED ORAL at 09:08

## 2020-08-26 RX ADMIN — IPRATROPIUM BROMIDE AND ALBUTEROL SULFATE 3 ML: .5; 2.5 SOLUTION RESPIRATORY (INHALATION) at 07:08

## 2020-08-26 RX ADMIN — TAMSULOSIN HYDROCHLORIDE 0.4 MG: 0.4 CAPSULE ORAL at 09:08

## 2020-08-26 RX ADMIN — REMDESIVIR 100 MG: 100 INJECTION, POWDER, LYOPHILIZED, FOR SOLUTION INTRAVENOUS at 03:08

## 2020-08-26 RX ADMIN — DEXAMETHASONE 6 MG: 4 TABLET ORAL at 08:08

## 2020-08-26 RX ADMIN — PANTOPRAZOLE SODIUM 40 MG: 40 TABLET, DELAYED RELEASE ORAL at 08:08

## 2020-08-26 RX ADMIN — Medication 500 MG: at 09:08

## 2020-08-26 RX ADMIN — LOSARTAN POTASSIUM 50 MG: 25 TABLET, FILM COATED ORAL at 09:08

## 2020-08-26 RX ADMIN — THERA TABS 1 TABLET: TAB at 08:08

## 2020-08-26 NOTE — PT/OT/SLP PROGRESS
"Physical Therapy Treatment    Patient Name:  Pinky Rivera   MRN:  [id number]    Recommendations:     Discharge Recommendations:  home health PT   Discharge Equipment Recommendations: none   Barriers to discharge: increased O2 requirements     Assessment:     Pinky Rivera is a 89 y.o. male admitted with a medical diagnosis of Pneumonia due to COVID-19 virus.  He presents with the following impairments/functional limitations:  weakness, impaired endurance, impaired self care skills, impaired functional mobilty, gait instability, impaired balance, impaired cardiopulmonary response to activity. Patient tolerated session well. He was on 10L of O2 throughout with sats between 95-88%. Patient would continue to benefit from skilled PT services while in the hospital. At this time, upon d/c he is an appropriate candidate for HHPT in order to progress towards an improved level of functional mobility independence.     Rehab Prognosis: Good; patient would benefit from acute skilled PT services to address these deficits and reach maximum level of function.    Recent Surgery: * No surgery found *      Plan:     During this hospitalization, patient to be seen 4 x/week to address the identified rehab impairments via gait training, therapeutic activities, neuromuscular re-education and progress toward the following goals:    · Plan of Care Expires:  09/24/20    Subjective     Chief Complaint: LOS   Patient/Family Comments/goals: "I just think I would recover better if I was at home."  Pain/Comfort:  · Pain Rating 1: 0/10  · Pain Rating Post-Intervention 1: 0/10      Objective:     Communicated with RN prior to session.  Patient found up in chair with oxygen, telemetry, pulse ox (continuous) upon PT entry to room.     General Precautions: Standard, fall, airborne, droplet, contact   Orthopedic Precautions:N/A   Braces: N/A     Functional Mobility:  · Transfers:     · Sit to Stand:  stand by assistance with rolling " [facility] Medicine Daily Progress Note    Date of Service  9/20/2019    Chief Complaint  60 y.o. male admitted 9/10/2019 with history of chronic pain syndrome on high-dose narcotics admitted with respiratory failure secondary to pneumonia CTA was negative for PE required intubation on 9/14/2019    Hospital Course          Interval Problem Update    Asleep arousable  Soft blood pressure but patient asymptomatic  BM this am  3L NC   Evaluated by SLP and diet advanced        Consultants/Specialty  ID  CC    Code Status  Full code    Disposition  SNF referral    Review of Systems  Review of Systems   Constitutional: Negative for chills and fever.   Respiratory: Positive for shortness of breath. Negative for cough and sputum production.    Cardiovascular: Negative for chest pain and palpitations.   Gastrointestinal: Negative for abdominal pain, nausea and vomiting.   Musculoskeletal: Positive for back pain. Negative for falls.   All other systems reviewed and are negative.       Physical Exam  Temp:  [36.4 °C (97.5 °F)-36.5 °C (97.7 °F)] 36.4 °C (97.5 °F)  Pulse:  [61-70] 70  Resp:  [16-22] 16  BP: ()/(60-89) 76/60  SpO2:  [84 %-97 %] 94 %    Physical Exam   Constitutional: He is oriented to person, place, and time. He appears well-developed and well-nourished.   HENT:   Head: Normocephalic and atraumatic.   Right Ear: External ear normal.   Left Ear: External ear normal.   Mouth/Throat: No oropharyngeal exudate.   Eyes: Conjunctivae are normal. Right eye exhibits no discharge. Left eye exhibits no discharge. No scleral icterus.   Neck: Neck supple. No JVD present. No tracheal deviation present.   Cardiovascular: Normal rate and regular rhythm. Exam reveals no gallop and no friction rub.   No murmur heard.  Pulmonary/Chest: Effort normal. No stridor. No respiratory distress. He has decreased breath sounds. He has no wheezes. He has rales. He exhibits no tenderness.   Abdominal: Soft. Bowel sounds are normal. He  exhibits no distension and no mass. There is no tenderness. There is no rebound and no guarding.   Musculoskeletal: He exhibits no edema or tenderness.   Neurological: He is alert and oriented to person, place, and time. No cranial nerve deficit. He exhibits normal muscle tone.   Skin: Skin is warm and dry. He is not diaphoretic. No cyanosis. Nails show no clubbing.   Psychiatric: He has a normal mood and affect. His behavior is normal.   Nursing note and vitals reviewed.      Fluids    Intake/Output Summary (Last 24 hours) at 9/20/2019 0858  Last data filed at 9/20/2019 0800  Gross per 24 hour   Intake 1440 ml   Output 850 ml   Net 590 ml       Laboratory  Recent Labs     09/18/19  0530 09/19/19  0454 09/20/19  0625   WBC 19.4* 23.2* 21.7*   RBC 4.64* 5.28 5.03   HEMOGLOBIN 12.5* 14.3 13.7*   HEMATOCRIT 39.9* 45.7 45.0   MCV 86.0 86.6 89.5   MCH 26.9* 27.1 27.2   MCHC 31.3* 31.3* 30.4*   RDW 44.5 44.9 46.4   PLATELETCT 374 438 403   MPV 8.9* 9.0 9.0     Recent Labs     09/18/19 0530 09/19/19  0454 09/20/19  0625   SODIUM 137 141 141   POTASSIUM 4.0 3.4* 4.1   CHLORIDE 103 107 108   CO2 25 24 25   GLUCOSE 128* 116* 112*   BUN 42* 44* 40*   CREATININE 0.86 1.03 0.95   CALCIUM 8.9 9.0 8.6                   Imaging  DX-CHEST-PORTABLE (1 VIEW)   Final Result         1.  Pulmonary edema and/or infiltrates are identified, which are stable since the prior exam.         DX-CHEST-PORTABLE (1 VIEW)   Final Result         1.  Pulmonary edema and/or infiltrates are identified, which are stable since the prior exam.      DX-CHEST-PORTABLE (1 VIEW)   Final Result         1.  Pulmonary edema and/or infiltrates are identified, which are stable since the prior exam.      DX-CHEST-PORTABLE (1 VIEW)   Final Result         1.  No significant interval change.      CT-CHEST (THORAX) W/O   Final Result      1.  Minimal improvement of presumed bilateral pneumonitis compared to 9/9/2019.  Findings likely significantly improved from prior  walker  · Gait: 6ftx4 with SBA and CGA for directional changes due to line mgt  · FFP over RW, steady and decreased josette   · 1 seated rest break   · Balance: static standing - SBA; dynamic standing - SBA-CGA     While UIC - x15 BLE TE: LAQ, hip abd/add, ankle DF/PF     AM-PAC 6 CLICK MOBILITY  Turning over in bed (including adjusting bedclothes, sheets and blankets)?: 4  Sitting down on and standing up from a chair with arms (e.g., wheelchair, bedside commode, etc.): 3  Moving from lying on back to sitting on the side of the bed?: 3  Moving to and from a bed to a chair (including a wheelchair)?: 3  Need to walk in hospital room?: 3  Climbing 3-5 steps with a railing?: 3  Basic Mobility Total Score: 19       Therapeutic Activities and Exercises:  -Patient educated on the continued role and goal of PT  -Questions and concerns answered within the the PT scope of practice.   -White board updated in patient room to reflect level of assistance needed with nursing.     Patient left up in chair with all lines intact, call button in reach and RN notified..    GOALS:   Multidisciplinary Problems     Physical Therapy Goals        Problem: Physical Therapy Goal    Goal Priority Disciplines Outcome Goal Variances Interventions   Physical Therapy Goal     PT, PT/OT Ongoing, Progressing     Description: Goals to be met by: 2020     Patient will increase functional independence with mobility by performin. Supine to sit with Modified [place]  2. Sit to stand transfer with Modified [place]  3. Gait  x 100 feet with Modified [place] using Rolling Walker or SPC   4. Stand for 5 minutes with Modified [place] using Rolling Walker or SPC                      Time Tracking:     PT Received On: 20  PT Start Time: 1047     PT Stop Time: 1111  PT Total Time (min): 24 min     Billable Minutes: Gait Training 12 and Therapeutic Exercise 12    Treatment Type: Treatment  PT/PTA: PT     PTA Visit Number: 0      Oly Jimenez, PT  08/26/2020     chest x-ray dated 9/12/2019.   2.  Moderate emphysema, likely paraseptal.               DX-CHEST-FOR LINE PLACEMENT Perform procedure in: Patient's Room (S134)   Final Result            1. A left central venous catheter with tip projects appropriately over the expected area of the superior vena cava.      DX-ABDOMEN FOR TUBE PLACEMENT   Final Result         Feeding tube with tip projecting over the expected area of the stomach fundus.      DX-CHEST-PORTABLE (1 VIEW)   Final Result         1. Interval intubation. No other significant interval change.      EC-ECHOCARDIOGRAM COMPLETE W/O CONT   Final Result      DX-CHEST-PORTABLE (1 VIEW)   Final Result      Bilateral airspace opacities, left greater than right worrisome for multifocal pneumonia.         DX-CHEST-2 VIEWS   Final Result         New airspace opacity in the left lower lobe, concerning for pneumonia.      OUTSIDE IMAGES-CT CHEST   Final Result           Assessment/Plan  * Hypotension- (present on admission)  Assessment & Plan  Half liter of LR bolus and monitor closely    Acute respiratory failure with hypoxia, emphsema and pneumonitis (HCC)- (present on admission)  Assessment & Plan  Extubated on 9/16/2019    Continue oxygen  Continue prednisone taper  RT protocol    Pneumonitis- (present on admission)  Assessment & Plan  Completed antibiotic course with ceftriaxone and doxycycline  Monitor clinically  Repeat chest x-ray in a.m.    Elevated glucose  Assessment & Plan  Likely steroid-induced    Interstitial lung disease (HCC)  Assessment & Plan  Patient will need outpatient pulmonary follow-up for further work-up    Paraseptal emphysema (HCC)  Assessment & Plan  RT protocol  Taper steroids as tolerated      Pulmonary hypertension (HCC)  Assessment & Plan  RVSP 55    Monitor I's and O's  Will need further work-up as outpatient with pulmonary follow-up    Urinary retention  Assessment & Plan  Discussed initiating Flomax    Dysphasia  Assessment &  Plan  Aspiration precautions  Advance diet per speech therapy    Chronic pain- (present on admission)  Assessment & Plan  With narcotic dependence    Continue methadone and gabapentin (home meds)  I will decrease his Xanax  Avoid escalating sedating agents  Close clinical monitoring    Hypothyroidism- (present on admission)  Assessment & Plan  Stable on levothyroxine      Plan of care reviewed with patient and discussed with nursing staff and Dr. Padilla await SNF approval    VTE prophylaxis: Lovenox

## 2020-08-26 NOTE — PLAN OF CARE
Pt took all medications, used urinal twice, ling sound clear/diminished, call light in place, bed in low. Pt safe

## 2020-08-26 NOTE — PROGRESS NOTES
[facility] Hospital Medicine  Progress Note    Patient Name: Pinky Rivera  MRN: [id number]  Patient Class: IP- Inpatient   Admission Date: 8/22/2020  Length of Stay: 4 days  Attending Physician: Curry Long MD  Primary Care Provider: Umair Cevallos MD        Subjective:     Principal Problem:Pneumonia due to COVID-19 virus    Interval History:  Yesterday CTA completed and NO PE noted, patient was on 6L this morning after exerting himself was placed back on high flow @ 10L during my visit.  He does not note any overt change in symptoms.     His biggest concern is when he might go home.  Informed patient that given his age and level of O2 requirement, while I am encourage there was no acute cardiac or thromboembolic event yesterday that made his O2 requirement go up, He may take some time to recover.  PT/OT have recommended home with home health when pt ready.     If patient is closer to 2-3 L of steady not changing O2 requirement may be stable for discharge.     Updated patient's Daughter and Son-in law via phone call about the workup started yesterday     Pts appetite is improved.     Review of Systems   Constitutional: Positive for fatigue.   Respiratory: Positive for cough and shortness of breath.    Cardiovascular: Negative for chest pain and leg swelling.   Gastrointestinal: Negative for abdominal distention, abdominal pain and diarrhea.     Objective:     Vital Signs (Most Recent):  Temp: 98.4 °F (36.9 °C) (08/26/20 1243)  Pulse: 66 (08/26/20 1503)  Resp: (!) 24 (08/26/20 1243)  BP: 128/61 (08/26/20 1243)  SpO2: (!) 94 % (08/26/20 1428) Vital Signs (24h Range):  Temp:  [97.4 °F (36.3 °C)-98.5 °F (36.9 °C)] 98.4 °F (36.9 °C)  Pulse:  [] 66  Resp:  [21-31] 24  SpO2:  [91 %-97 %] 94 %  BP: (120-185)/(55-84) 128/61     Weight: 94 kg (207 lb 2 oz)  Body mass index is 32.44 kg/m².    Intake/Output Summary (Last 24 hours) at 8/26/2020 1538  Last data filed at 8/26/2020  0900  Gross per 24 hour   Intake 300 ml   Output 640 ml   Net -340 ml      Physical Exam  Constitutional:       Appearance: He is obese. He is ill-appearing.   Eyes:      General: No scleral icterus.  Cardiovascular:      Rate and Rhythm: Normal rate and regular rhythm.   Pulmonary:      Effort: Pulmonary effort is normal.      Breath sounds: Wheezing and rales present.      Comments: End expiratory wheezing noted  Abdominal:      General: Abdomen is flat. Bowel sounds are normal.      Palpations: Abdomen is soft.   Musculoskeletal:      Comments: Feet cool to touch, left 1+,  Right unable to palp dp pulse,   Skin:     General: Skin is warm and dry.   Neurological:      General: No focal deficit present.      Mental Status: He is alert and oriented to person, place, and time.               Significant Labs:   CBC:   Recent Labs   Lab 08/25/20 0426 08/26/20  0332   WBC 7.71 8.52   HGB 14.6 14.0   HCT 47.4 45.0    248     CMP:   Recent Labs   Lab 08/25/20 0425 08/26/20  0332    137   K 4.8 4.6    103   CO2 27 27   * 113*   BUN 27* 24*   CREATININE 0.9 1.1   CALCIUM 8.3* 8.4*   PROT 6.5 6.2   ALBUMIN 2.9* 2.7*   BILITOT 0.5 0.4   ALKPHOS 62 59   AST 45* 31   ALT 37 30   ANIONGAP 8 7*   EGFRNONAA >60.0 58.8*     Recent Labs     08/25/20 0425 08/25/20 0426 08/25/20  0946   DDIMER  --  4.92*  --    FERRITIN 466*  --   --    CRP 36.6*  --   --    *  --   --    BNP  --   --  58   TROPONINI  --   --  0.032*   CPK 65  --   --          All pertinent labs within the past 24 hours have been reviewed.    Significant Imaging: I have reviewed all pertinent imaging results/findings within the past 24 hours.    Assessment/Plan:      89 y/o with partial blindness, CKD3, HTN, who is admitted with COVID pneumonia and initially 2L O2 requirement. Pt with high functioning ADL at baseline, no cognitive deficits at admit.  Hard of hearing and right visual deficits.     On Tuesday (8/25) had an acute  increase in his O2 requirement cardiac workup with EKG BNP and troponins negative ACTH chest was negative for PE had kept him on 24 hr of Lovenox but taper back to DVT prophylaxis dose.  He still gets very hypoxic exertion and will likely require some time to to recover    8/26 - Today he completed room does severe and empiric antibiotic course is on day 6 of dexamethasone    COVID-19 Virus Infection  Viral Pneumonia due to COVID-19  - COVID-19 testing: Positive on 8/21/20  - Isolation: Airborne/Droplet.   - Diagnostics: >blood cultures, flu swab, strep A screen negative   -D-dimer elevated, CRP downtrending/CK downtrending, LDH stable  -continue dexamethasone   -Remdesivir (D5/5) to complete today  -Notes decreased exercise tolerance  -supplements - ascorbic acid_vitamin D_MVI  -empiric abx - CTX/Azithro completed 5 day course.   -Change Lab frequency to every 48hrs.     Acute Hypoxemic Respiratory Failure  -continue dexamethasone 6 mg daily  (Day 6 of 10)  -Patient on 10L High flow after exertion, RT to continue to titrate to goal sats 92%  -on 8/25 with rising O2 requirement, workup with EKG, BNP, Troponin all stable w/o acute condition.  CTA Chest obtained and negative for PE, Empiric anticoagulation given for 24hrs and will be tapered back to DVT prophylaxis dosing.   8/25 - New Wheezing on exam and continuing nebulizers.    -PPI while on steroid   -pharmacy consult for Remdesivir     # HTN   -Due to rising Blood pressure have increased Losartan dosing to 50mg PO BID.      #CKD III   -monitor closely and avoid nephrotoxic agents   -Cr in normal range.      #BPH  -no acute issue   -continue home dose flomax and proscar      #Chronic venous stasis of legs   -no edema on exam   -hold lasix given decreased PO intake   -feet cold to touch, rn notes DP pulses are dopplerable      # mild elevated troponin   -troponin 0.09 due to COVID and in setting of CKD   -no chest pain and EKG w/o ischemia   -trend shows flat to  downtrending.   -continue aspirin     Constipation-slow transit  -started routine bowel regimen/senna-docusate, miralax  -s/p lactulose x 1  -Resolved with above regimen    Active Diagnoses:    Diagnosis Date Noted POA    PRINCIPAL PROBLEM:  Pneumonia due to COVID-19 virus [U07.1, J12.89] 08/22/2020 Yes    Acute hypoxemic respiratory failure [J96.01] 08/22/2020 Yes    Essential hypertension [I10]  Yes    CKD (chronic kidney disease), stage III [N18.3] 08/22/2020 Yes    BPH (benign prostatic hyperplasia) [N40.0] 08/22/2020 Yes    Elevated troponin [R79.89] 08/22/2020 Yes    Palliative care encounter [Z51.5] 08/25/2020 Not Applicable    Blindness of right eye [H54.40] 08/22/2020 Yes     Chronic      Problems Resolved During this Admission:     VTE Risk Mitigation (From admission, onward)         Ordered     enoxaparin injection 40 mg  Every 24 hours      08/26/20 0851     IP VTE HIGH RISK PATIENT  Once      08/22/20 0229     Place sequential compression device  Until discontinued      08/22/20 0229               High Risk Conditions:    Patient has a condition that poses threat to life and bodily function: Severe Respiratory Distress and COVID19 pneumonia            Curry Long MD  Department of Hospital Medicine   [facility]

## 2020-08-27 LAB
ALBUMIN SERPL BCP-MCNC: 2.7 G/DL (ref 3.5–5.2)
ALP SERPL-CCNC: 64 U/L (ref 55–135)
ALT SERPL W/O P-5'-P-CCNC: 26 U/L (ref 10–44)
ANION GAP SERPL CALC-SCNC: 6 MMOL/L (ref 8–16)
AST SERPL-CCNC: 21 U/L (ref 10–40)
BACTERIA BLD CULT: NORMAL
BACTERIA BLD CULT: NORMAL
BASOPHILS # BLD AUTO: 0.01 K/UL (ref 0–0.2)
BASOPHILS NFR BLD: 0.1 % (ref 0–1.9)
BILIRUB SERPL-MCNC: 0.6 MG/DL (ref 0.1–1)
BUN SERPL-MCNC: 24 MG/DL (ref 8–23)
CALCIUM SERPL-MCNC: 8.7 MG/DL (ref 8.7–10.5)
CHLORIDE SERPL-SCNC: 104 MMOL/L (ref 95–110)
CK SERPL-CCNC: 40 U/L (ref 20–200)
CO2 SERPL-SCNC: 28 MMOL/L (ref 23–29)
CREAT SERPL-MCNC: 1 MG/DL (ref 0.5–1.4)
CRP SERPL-MCNC: 29.6 MG/L (ref 0–8.2)
D DIMER PPP IA.FEU-MCNC: 5.45 MG/L FEU
DIFFERENTIAL METHOD: ABNORMAL
EOSINOPHIL # BLD AUTO: 0 K/UL (ref 0–0.5)
EOSINOPHIL NFR BLD: 0 % (ref 0–8)
ERYTHROCYTE [DISTWIDTH] IN BLOOD BY AUTOMATED COUNT: 14 % (ref 11.5–14.5)
ERYTHROCYTE [SEDIMENTATION RATE] IN BLOOD BY WESTERGREN METHOD: 24 MM/HR (ref 0–23)
EST. GFR  (AFRICAN AMERICAN): >60 ML/MIN/1.73 M^2
EST. GFR  (NON AFRICAN AMERICAN): >60 ML/MIN/1.73 M^2
FERRITIN SERPL-MCNC: 391 NG/ML (ref 20–300)
GLUCOSE SERPL-MCNC: 103 MG/DL (ref 70–110)
HCT VFR BLD AUTO: 47.2 % (ref 40–54)
HGB BLD-MCNC: 15 G/DL (ref 14–18)
IMM GRANULOCYTES # BLD AUTO: 0.09 K/UL (ref 0–0.04)
IMM GRANULOCYTES NFR BLD AUTO: 1 % (ref 0–0.5)
LDH SERPL L TO P-CCNC: 329 U/L (ref 110–260)
LYMPHOCYTES # BLD AUTO: 0.4 K/UL (ref 1–4.8)
LYMPHOCYTES NFR BLD: 4.7 % (ref 18–48)
MAGNESIUM SERPL-MCNC: 2.1 MG/DL (ref 1.6–2.6)
MCH RBC QN AUTO: 31.1 PG (ref 27–31)
MCHC RBC AUTO-ENTMCNC: 31.8 G/DL (ref 32–36)
MCV RBC AUTO: 98 FL (ref 82–98)
MONOCYTES # BLD AUTO: 0.6 K/UL (ref 0.3–1)
MONOCYTES NFR BLD: 6.4 % (ref 4–15)
NEUTROPHILS # BLD AUTO: 7.7 K/UL (ref 1.8–7.7)
NEUTROPHILS NFR BLD: 87.8 % (ref 38–73)
NRBC BLD-RTO: 0 /100 WBC
PHOSPHATE SERPL-MCNC: 2.6 MG/DL (ref 2.7–4.5)
PLATELET # BLD AUTO: 257 K/UL (ref 150–350)
PMV BLD AUTO: 10.3 FL (ref 9.2–12.9)
POTASSIUM SERPL-SCNC: 4.8 MMOL/L (ref 3.5–5.1)
PROT SERPL-MCNC: 6.2 G/DL (ref 6–8.4)
RBC # BLD AUTO: 4.83 M/UL (ref 4.6–6.2)
SODIUM SERPL-SCNC: 138 MMOL/L (ref 136–145)
WBC # BLD AUTO: 8.75 K/UL (ref 3.9–12.7)

## 2020-08-27 PROCEDURE — 94640 AIRWAY INHALATION TREATMENT: CPT

## 2020-08-27 PROCEDURE — 84100 ASSAY OF PHOSPHORUS: CPT

## 2020-08-27 PROCEDURE — 11000001 HC ACUTE MED/SURG PRIVATE ROOM

## 2020-08-27 PROCEDURE — 83615 LACTATE (LD) (LDH) ENZYME: CPT

## 2020-08-27 PROCEDURE — 94761 N-INVAS EAR/PLS OXIMETRY MLT: CPT

## 2020-08-27 PROCEDURE — 63600175 PHARM REV CODE 636 W HCPCS: Performed by: HOSPITALIST

## 2020-08-27 PROCEDURE — 25000003 PHARM REV CODE 250: Performed by: HOSPITALIST

## 2020-08-27 PROCEDURE — 83735 ASSAY OF MAGNESIUM: CPT

## 2020-08-27 PROCEDURE — 99233 SBSQ HOSP IP/OBS HIGH 50: CPT | Mod: ,,, | Performed by: INTERNAL MEDICINE

## 2020-08-27 PROCEDURE — 97535 SELF CARE MNGMENT TRAINING: CPT

## 2020-08-27 PROCEDURE — 82550 ASSAY OF CK (CPK): CPT

## 2020-08-27 PROCEDURE — 27000221 HC OXYGEN, UP TO 24 HOURS

## 2020-08-27 PROCEDURE — 85025 COMPLETE CBC W/AUTO DIFF WBC: CPT

## 2020-08-27 PROCEDURE — 25000003 PHARM REV CODE 250: Performed by: INTERNAL MEDICINE

## 2020-08-27 PROCEDURE — 82728 ASSAY OF FERRITIN: CPT

## 2020-08-27 PROCEDURE — 99233 PR SUBSEQUENT HOSPITAL CARE,LEVL III: ICD-10-PCS | Mod: ,,, | Performed by: INTERNAL MEDICINE

## 2020-08-27 PROCEDURE — 25000242 PHARM REV CODE 250 ALT 637 W/ HCPCS: Performed by: INTERNAL MEDICINE

## 2020-08-27 PROCEDURE — 36415 COLL VENOUS BLD VENIPUNCTURE: CPT

## 2020-08-27 PROCEDURE — 80053 COMPREHEN METABOLIC PANEL: CPT

## 2020-08-27 PROCEDURE — 86140 C-REACTIVE PROTEIN: CPT

## 2020-08-27 PROCEDURE — 85652 RBC SED RATE AUTOMATED: CPT

## 2020-08-27 PROCEDURE — 85379 FIBRIN DEGRADATION QUANT: CPT

## 2020-08-27 RX ORDER — HYDRALAZINE HYDROCHLORIDE 50 MG/1
50 TABLET, FILM COATED ORAL EVERY 8 HOURS
Status: DISCONTINUED | OUTPATIENT
Start: 2020-08-27 | End: 2020-08-28

## 2020-08-27 RX ORDER — ALBUTEROL SULFATE 90 UG/1
2 AEROSOL, METERED RESPIRATORY (INHALATION)
Status: DISCONTINUED | OUTPATIENT
Start: 2020-08-27 | End: 2020-08-28 | Stop reason: HOSPADM

## 2020-08-27 RX ADMIN — ALBUTEROL SULFATE 2 PUFF: 90 AEROSOL, METERED RESPIRATORY (INHALATION) at 08:08

## 2020-08-27 RX ADMIN — Medication 500 MG: at 09:08

## 2020-08-27 RX ADMIN — CHOLECALCIFEROL (VITAMIN D3) 25 MCG (1,000 UNIT) TABLET 1000 UNITS: at 09:08

## 2020-08-27 RX ADMIN — ASPIRIN 81 MG: 81 TABLET, COATED ORAL at 09:08

## 2020-08-27 RX ADMIN — ALBUTEROL SULFATE 2 PUFF: 90 AEROSOL, METERED RESPIRATORY (INHALATION) at 07:08

## 2020-08-27 RX ADMIN — ALBUTEROL SULFATE 2 PUFF: 90 AEROSOL, METERED RESPIRATORY (INHALATION) at 01:08

## 2020-08-27 RX ADMIN — TAMSULOSIN HYDROCHLORIDE 0.4 MG: 0.4 CAPSULE ORAL at 09:08

## 2020-08-27 RX ADMIN — FINASTERIDE 5 MG: 5 TABLET, FILM COATED ORAL at 09:08

## 2020-08-27 RX ADMIN — THERA TABS 1 TABLET: TAB at 09:08

## 2020-08-27 RX ADMIN — ENOXAPARIN SODIUM 40 MG: 40 INJECTION SUBCUTANEOUS at 04:08

## 2020-08-27 RX ADMIN — LOSARTAN POTASSIUM 50 MG: 25 TABLET, FILM COATED ORAL at 08:08

## 2020-08-27 RX ADMIN — LOSARTAN POTASSIUM 50 MG: 25 TABLET, FILM COATED ORAL at 09:08

## 2020-08-27 RX ADMIN — Medication 500 MG: at 08:08

## 2020-08-27 RX ADMIN — HYDRALAZINE HYDROCHLORIDE 50 MG: 50 TABLET, FILM COATED ORAL at 09:08

## 2020-08-27 RX ADMIN — PANTOPRAZOLE SODIUM 40 MG: 40 TABLET, DELAYED RELEASE ORAL at 09:08

## 2020-08-27 RX ADMIN — HYDRALAZINE HYDROCHLORIDE 50 MG: 50 TABLET, FILM COATED ORAL at 08:08

## 2020-08-27 RX ADMIN — DEXAMETHASONE 6 MG: 4 TABLET ORAL at 09:08

## 2020-08-27 NOTE — PLAN OF CARE
Pt walked from chair to bed with the use of a FFW, O@ was at 6L start orf shift, Titrated down to 3L, sats stayed in 90's, had 300 output. Pt has call light in reach, bed in low, hob 35 degrees, pt safe.

## 2020-08-27 NOTE — PROGRESS NOTES
Hospital Medicine  Progress Note  [facility]      Patient Name: Pinky Rivera  MRN:  [id number]  Hospital Medicine Team: [facility] HOSP MED G Miles Hdez MD  Date of Admission:  8/22/2020     Length of Stay:  LOS: 5 days       Principal Problem:  Pneumonia due to COVID-19 virus      HPI:    Mr. Rivera is a 89 year old male with PMH of HTN, CKD III, BPH, chronic venous stasis of lower extremities, OA of knees, R eye blindness who presents as a transfer from [facility] ED for management of COVID pneumonia and associated hypoxia. Patient presented to ED last evening for evaluation of non productive cough for 1 month and progressive weakness over last 3-4 days. Family got concerned about his weakness and poor PO intake which prompted them to bring him to hospital. He was hypoxic with O2 sat 86% on room air upon arrival to ED which improved to 96% on 4L supplemental oxygen. He tested positive for COVID along with elevated inflammatory markers and D-dimer. CTA negative for PE but showed bilateral patchy ground glass opacities consistent with viral pneumonia. He received  cc bolus, dexamethasone, empiric dose of CTX and azithromycin prior to transfer. Blood cultures sent.      Patient denies fever, chills, headache, sore throat, chest pain, sob, palpitation, N/V/D, abdominal pain or urinary complaints. Patient states he does not go outside the house and not sure how he contracted the corona virus. He denies recent travel or sick contact. He is hard of hearing but appears comfortable and offers no complaints during my interview.       Hospital Course:  On Tuesday (8/25) had an acute increase in his O2 requirement cardiac workup with EKG BNP and troponins negative ACTH chest was negative for PE had kept him on 24 hr of Lovenox but taper back to DVT prophylaxis dose.  He still gets very hypoxic exertion and will likely require some time to to recover     On 8/26, he completed Remdesivir and  empiric antibiotic course and continued on dexamethasone.    Interval History:     Patient lying in bed, mild respiratory distress. Denies fever, chills, SOB, chest pain. Inquiring about when he will be medically ready for discharge.    Review of Systems:  Constitutional: Negative for fever, chills, fatigue, poor appetite   HENT: Negative for sore throat, negative for trouble swallowing.    Eyes: Negative for photophobia, visual disturbance.   Respiratory: Positive for cough, shortness of breath  Cardiovascular: Negative for chest pain, palpitations, leg swelling.   Gastrointestinal: Negative for abdominal pain, constipation, nausea, vomiting.  Endocrine: Negative for cold intolerance, heat intolerance.   Genitourinary: Negative for dysuria, frequency.   Musculoskeletal: Negative for arthralgias, myalgias.   Skin: Negative for rash  Neurological: Negative for dizziness, syncope, light-headedness.   Psychiatric/Behavioral: Negative for confusion, hallucinations, anxiety      Inpatient Medications:    Current Facility-Administered Medications:     acetaminophen tablet 650 mg, 650 mg, Oral, Q4H PRN, Arup K. Cheryle, DO    albuterol-ipratropium 2.5 mg-0.5 mg/3 mL nebulizer solution 3 mL, 3 mL, Nebulization, Q4H PRN, Arup K. Cheryle, DO    albuterol-ipratropium 2.5 mg-0.5 mg/3 mL nebulizer solution 3 mL, 3 mL, Nebulization, Q4H WAKE, Curry Long MD, 3 mL at 08/26/20 0744    ascorbic acid (vitamin C) tablet 500 mg, 500 mg, Oral, BID, Arup K. Cheryle, DO, 500 mg at 08/26/20 2119    aspirin EC tablet 81 mg, 81 mg, Oral, QAM, Arup K. Cheryle, DO, 81 mg at 08/26/20 0823    dexAMETHasone tablet 6 mg, 6 mg, Oral, Daily, Arup K. Cheryle, DO, 6 mg at 08/26/20 0822    dextromethorphan-guaifenesin  mg/5 ml liquid 10 mL, 10 mL, Oral, Q4H PRN, Arup K. Cheryle, DO, 10 mL at 08/23/20 0823    dextrose 50% injection 12.5 g, 12.5 g, Intravenous, PRN, Arup K. Cheryle, DO    dextrose 50% injection 25 g, 25 g, Intravenous, PRN, Marla Lobato,  "DO    enoxaparin injection 40 mg, 40 mg, Subcutaneous, Q24H, Curry Long MD    finasteride tablet 5 mg, 5 mg, Oral, DAILY PC, Marla Lobato DO, 5 mg at 08/26/20 0934    glucagon (human recombinant) injection 1 mg, 1 mg, Intramuscular, PRN, Marla Lobato, DO    glucose chewable tablet 16 g, 16 g, Oral, PRN, Marla Lobato DO    glucose chewable tablet 24 g, 24 g, Oral, PRN, Marla Lobato, DO    hydrALAZINE injection 10 mg, 10 mg, Intravenous, Q6H PRN, Curry Long MD    losartan tablet 50 mg, 50 mg, Oral, BID, Curry Long MD, 50 mg at 08/26/20 2119    multivitamin tablet, 1 tablet, Oral, Daily, Curry Long MD, 1 tablet at 08/26/20 0822    ondansetron injection 4 mg, 4 mg, Intravenous, Q6H PRN, Marla Lobato DO    pantoprazole EC tablet 40 mg, 40 mg, Oral, Daily, Curry Long MD, 40 mg at 08/26/20 0822    senna-docusate 8.6-50 mg per tablet 2 tablet, 2 tablet, Oral, BID PRN, Marla Lobato DO, 2 tablet at 08/22/20 1227    sodium chloride 0.9% flush 10 mL, 10 mL, Intravenous, PRN, Marla Lobato, , 10 mL at 08/23/20 0822    tamsulosin 24 hr capsule 0.4 mg, 0.4 mg, Oral, DAILY PC, Marla Lobato, , 0.4 mg at 08/26/20 0934    vitamin D 1000 units tablet 1,000 Units, 1,000 Units, Oral, Daily, Marla Lobato DO, 1,000 Units at 08/26/20 0822      Physical Exam:      Intake/Output Summary (Last 24 hours) at 8/27/2020 0731  Last data filed at 8/27/2020 0600  Gross per 24 hour   Intake 400 ml   Output 500 ml   Net -100 ml     Wt Readings from Last 3 Encounters:   08/22/20 94 kg (207 lb 2 oz)   02/09/19 90.7 kg (200 lb)   02/16/17 90.7 kg (200 lb)       BP (!) 199/88 (BP Location: Left arm, Patient Position: Lying)   Pulse (!) 51   Temp 97.6 °F (36.4 °C) (Oral)   Resp (!) 21   Ht 5' 7" (1.702 m)   Wt 94 kg (207 lb 2 oz)   SpO2 95%   BMI 32.44 kg/m²     GEN: mild respiratory distress, conversant  Resp: diminished breath sounds at lung bases, no wheezes or rales, normal work of " breathing   CV: RRR, no m/r/g, no edema  GI: soft, NTND  Skin: no rash  Neuro: AAOx3, CN grossly intact, no focal neurologic deficits    Laboratory:  Lab Results   Component Value Date    [id number] Positive (A) 08/21/2020       Recent Labs   Lab 08/24/20 0303 08/25/20 0426 08/26/20  0332   WBC 9.26 7.71 8.52   LYMPH 3.6*  0.3* 4.2*  0.3* 4.1*  0.4*   HGB 14.4 14.6 14.0   HCT 46.2 47.4 45.0    202 248     Recent Labs   Lab 08/24/20 0303 08/25/20  0425 08/26/20  0332    139 137   K 4.9 4.8 4.6    104 103   CO2 21* 27 27   BUN 30* 27* 24*   CREATININE 1.2 0.9 1.1   * 113* 113*   CALCIUM 8.3* 8.3* 8.4*   MG 2.1 2.3 2.2   PHOS 2.9 2.8 2.8     Recent Labs   Lab 08/21/20  1905 08/24/20 0303 08/25/20 0425 08/26/20  0332   ALKPHOS 53*   < > 60 62 59   ALT 13   < > 19 37 30   AST 30   < > 29 45* 31   ALBUMIN 3.3*   < > 2.9* 2.9* 2.7*   PROT 7.3   < > 6.5 6.5 6.2   BILITOT 0.5   < > 0.4 0.5 0.4   INR 1.0  --   --   --   --     < > = values in this interval not displayed.        Recent Labs     08/25/20 0425 08/25/20 0426 08/25/20  0946   DDIMER  --  4.92*  --    FERRITIN 466*  --   --    CRP 36.6*  --   --    *  --   --    BNP  --   --  58   TROPONINI  --   --  0.032*   CPK 65  --   --        All labs within the last 24 hours were reviewed.     Microbiology:  Microbiology Results (last 7 days)     ** No results found for the last 168 hours. **            Imaging      No results found for this or any previous visit.    CTA Chest Non Coronary  Narrative: EXAMINATION:  CTA CHEST NON CORONARY    CLINICAL HISTORY:  PE suspected, intermediate prob, positive D-dimer;covid;    TECHNIQUE:  Low dose axial images, sagittal and coronal reformations were obtained from the thoracic inlet to the lung bases following the IV administration of 100 mL of Omnipaque 350.  Contrast timing was optimized to evaluate the pulmonary arteries.  MIP images were performed.    COMPARISON:  August 21,  2020    FINDINGS:  There is no large central saddle type pulmonary embolus, the pulmonary arterial vascular structures demonstrate opacification without evidence for abnormal pattern of pulmonary arterial filling defects specific for pulmonary emboli.    The thoracic aorta demonstrates appropriate opacification, atherosclerotic changes noted, there is no enlarged mediastinal or hilar adenopathy seen, there is no pericardial effusion, there is no pleural effusion.  There is elevation of the right hemidiaphragm again noted, the lungs bilaterally demonstrate motion artifact and atelectatic change, there are areas of pulmonary opacity bilaterally consistent with pulmonary infiltrate/airspace disease, this appears similar to the prior study with perhaps mild progression.  There is no evidence for pneumothorax.  Limited imaging of the upper abdomen demonstrates no evidence for acute upper abdominal process.  Hypodense renal lesions on the right noted not well evaluated, the patient appears status post cholecystectomy.  The osseous structures demonstrate chronic change.  Impression: There is no large central saddle type pulmonary embolus, and no additional evidence for pulmonary embolus on this exam.    Elevation of the right hemidiaphragm again noted.    Bilateral pattern of pulmonary infiltrates/airspace disease again noted, as discussed above.    Electronically signed by: Que Diaz  Date:    08/25/2020  Time:    21:44  X-Ray Chest AP Portable  Narrative: EXAMINATION:  XR CHEST AP PORTABLE    CLINICAL HISTORY:  worsening respiratory failure due to COVID, eval for pulm edema/effusion vs worsening infiltrate;    TECHNIQUE:  Single frontal view of the chest was performed.    COMPARISON:  08/21/2020, 19:19 hours.  02/19/2020.    FINDINGS:  Mediastinal structures are midline.  Calcific plaque noted at the aortic arch in this 89-year-old man.    There is chronic elevation of the right hemidiaphragm as seen on earlier  studies dating back to 02/09/2019.    Multifocal patchy subsegmental opacities are present in both lungs similar to 08/21/2020 and compatible with COVID-19 pulmonary infection, noting that the patient had a positive antigen test on 08/21/2019.    I detect no new pulmonary disease and no pleural fluid, pneumothorax, pneumomediastinum, pneumoperitoneum or significant osseous abnormality.  Degenerative changes are present at the glenohumeral and acromioclavicular joints.  Impression: Please see above.    Electronically signed by: Mana Barnes MD  Date:    08/25/2020  Time:    10:41      All imaging within the last 24 hours was reviewed.     Assessment and Plan:    Active Hospital Problems    Diagnosis  POA    *Pneumonia due to COVID-19 virus [U07.1, J12.89]  Yes    Palliative care encounter [Z51.5]  Not Applicable    Acute hypoxemic respiratory failure [J96.01]  Yes    BPH (benign prostatic hyperplasia) [N40.0]  Yes    CKD (chronic kidney disease), stage III [N18.3]  Yes    Elevated troponin [R79.89]  Yes    Blindness of right eye [H54.40]  Yes     Chronic    Essential hypertension [I10]  Yes      Resolved Hospital Problems   No resolved problems to display.         Suspected COVID-19 Virus Infection  Viral Pneumonia due to COVID-19  - COVID-19 testing:   - Isolation: Airborne/Droplet. Surgical mask on patient. Notify Infection Control  - Diagnostics: Trend Q48hrs if stable, more frequently if patient decompensating     Laboratory/Study Frequency Result   CBC Admit & Q48h    CMP Admit & Q48h    Magnesium level Admit    D-dimer Admit & Q48h    Ferritin Admit & Q48h    CRP Admit & Q48h    CPK Admit & Q24h if elevated    LDH Admit & Q48h    Vitamin D Admit    BNP Admit    Troponin Admit & Q6h if elevated    Glucose-6-phos dehydrogenase Admit    Procalcitonin Admit    Lipid Panel If using statin    Rapid influenza Admit    Resp Infection Panel Admit if BMT/organ transplant    Legionella Antigen Admit    Sputum  Culture Admit    Blood Culture Admit    Urinalysis & Culture Admit    ECG Admit & Q48h if on HCQ    CXR Admit    Lymphopenia, hyponatremia, hyperferritinemia, elevated troponin, elevated d-dimer, age, and medical comorbidities are significant predictors of poor clinical outcome    - Management: per [facility] COVID Treatment Protocol (4/15/20)    - Monitoring:   - Telemetry & Continuous Pulse Oximetry    - Nutrition:    - Multivitamin PO daily   - Add Boost supplement   - Vitamin D 1000IU daily if deficient   - Ascorbic acid 500mg PO bid    - Supportive Care:   - acetaminophen 650mg PO Q6hr PRN fever/headache   - loperamide PRN viral diarrhea   - IVF if indicated, restrictive strategy preferred, no maintenance IV if able   - VTE PPx: enoxaparin or heparin SQ unless contraindicated    - Antibiotics:  - if indications, CXR findings, elevated procal. See protocol for alternatives.   - Discontinue early if low concern for bacterial co-infection   - ceftriaxone 1g Q24h x 5 days  - azithromycin 500mg po x1, then 250mg po daily x 4 days    - Investigational Therapies:   - If patient meets criteria  - atorvastatin 40mg po daily      Acute Hypoxemic Respiratory Failure  - Order RT consult via Respiratory Communication for COVID Protocols  - Order Incentive Spirometer Q4h  - Order Flutter Valve Q4h  - Continuous Pulse Oximetry  - Goal SpO2 92-96%  - Supplemental O2 via LFNC, VentiMask, or HFNC (see Respiratory Support Oxygen Therapies)  - If wheezing, albuterol INH Q6h scheduled & PRN  - Proning Protocol if patient is a candidate (see  Proning Protocol)   - GCS >13, able to self-prone  - If deterioration, may warrant trial of NIPPV and transfer to [facility] pressure room or immediate ICU consult    COVID-19 Virus Infection  Viral Pneumonia due to COVID-19  - COVID-19 testing: Positive on 8/21/20  - Isolation: Airborne/Droplet.   - Diagnostics: >blood cultures, flu swab, strep A screen negative   -D-dimer elevated, CRP  "downtrending/CK downtrending, LDH stable  -continue dexamethasone   -Remdesivir completed   -supplements - ascorbic acid_vitamin D_MVI  -empiric abx - CTX/Azithro completed 5 day course.   -Change Lab frequency to every 48hrs.      Acute Hypoxemic Respiratory Failure  -continue dexamethasone 6 mg daily   -Patient initially 10L High flow after exertion and weaned to 3L NC  - CTA Chest obtained and negative for PE, Empiric anticoagulation given for 24hrs and will be tapered back to DVT prophylaxis dosing.   - continuing nebulizers.    -PPI while on steroid   -pharmacy consult for Remdesivir      # HTN   -Losartan dosing to 50mg PO BID.      #CKD III   -monitor closely and avoid nephrotoxic agents   -Cr in normal range.      #BPH  -no acute issue   -continue home dose flomax and proscar      #Chronic venous stasis of legs   -no edema on exam   -hold lasix given decreased PO intake   -feet cold to touch, DP pulses are dopplerable      # mild elevated troponin   -troponin 0.09 due to COVID and in setting of CKD   -no chest pain and EKG w/o ischemia   -trend shows flat to downtrending.   -continue aspirin      Constipation-slow transit  -started routine bowel regimen/senna-docusate, miralax  -s/p lactulose x 1  -Resolved with above regimen        Advance Care Planning  Goals of care, counseling/discussion: Full code  Advance Care Planning     If patient transitions to Comfort-Focused Care, please place "Nurse Communication: End of Life Care, family members allowed to visit, including spouse/partner and adult children [please list names]. Please ask family to visit as a group and leave as a group.       VTE High Risk Prophylaxis:   VTE Risk Mitigation (From admission, onward)         Ordered     enoxaparin injection 40 mg  Every 24 hours      08/26/20 0851     IP VTE HIGH RISK PATIENT  Once      08/22/20 0229     Place sequential compression device  Until discontinued      08/22/20 0229                  Subsequent " Inpatient Hospital Care    Level 3 21001 Total visit time was 35 minutes or greater with greater than 50% of time spent in counseling and coordination of care.     High Risk Conditions:  Patient has a condition that poses threat to life and bodily function: Severe Respiratory Distress        Miles Hdez MD  Hospital Medicine Staff  Pager: [phone]; Spectra: [phone]

## 2020-08-27 NOTE — PT/OT/SLP PROGRESS
Occupational Therapy   Treatment    Name: Pinky Rivera  MRN: [id number]  Admitting Diagnosis:  Pneumonia due to COVID-19 virus       Recommendations:     Discharge Recommendations: home health OT  Discharge Equipment Recommendations:  none  Barriers to discharge:  None    Assessment:     Pinky Rivrea is a 89 y.o. male with a medical diagnosis of Pneumonia due to COVID-19 virus.  Pt presents with decreased endurance and impaired mobility performance as limited by cardiovascular status and generalized weakness. Pt willing to participate with OT this morning for therapy session. Pt remains frustrated with prolonged hospitalization however receptive to gentle education in relation to pt's  medical management (especially current cardiovascular needs) at this time. Pt session included bed mobility, sit<stand t/f, bedroom mobility, and safe setup in chair. Pt initiated session on 2L 02 however required 3L02 following short distance mobility with RW. Pt desat high ~70s throughout mobility with pulse ox measures questionable both on ear and on fingertip. RN Evangelist notified. Pt left [facility] speaking with Dr. Hdez. Performance deficits affecting function are weakness, impaired self care skills, impaired endurance, gait instability, impaired functional mobilty, impaired balance, impaired cardiopulmonary response to activity. Pt would benefit from continued OT skilled services 4x/wk to improve daily living skills to optimize QOL. Pt is recommended to discharge to OT at this time.      Rehab Prognosis:  Good; patient would benefit from acute skilled OT services to address these deficits and reach maximum level of function.       Plan:     Patient to be seen 3 x/week to address the above listed problems via self-care/home management, therapeutic activities, therapeutic exercises  · Plan of Care Expires: 09/24/20  · Plan of Care Reviewed with: patient    Subjective     Pain/Comfort:  · Pain Rating 1: 0/10  · Pain Rating  Post-Intervention 1: 0/10    Objective:     Communicated with: RN prior to session.  Patient found HOB elevated with oxygen, telemetry, pulse ox (continuous) upon OT entry to room.    General Precautions: Standard, airborne, droplet, fall, contact   Orthopedic Precautions:N/A   Braces: N/A     Occupational Performance:     Bed Mobility:    · Patient completed Rolling/Turning to Right with stand by assistance  · Patient completed Scooting/Bridging with stand by assistance  · Patient completed Supine to Sit with stand by assistance     Functional Mobility/Transfers:  · Patient completed Sit <> Stand Transfer with contact guard assistance  with  rolling walker   · Patient completed Bed <> Chair Transfer using Step Transfer technique with contact guard assistance with rolling walker  · Functional Mobility: Pt completed short distance transfer from bed to chair with CGA using RW. Pt limited in further mobility 2/2 desat with 02. RN notified.     Activities of Daily Living:  · Grooming: setup (A)  washing face and brushing teeth sitting in chair.  · Upper Body Dressing: minimum assistance donning gown at EOB       [facility] 6 Click ADL: 19    Treatment & Education:  Pt educated on role of occupational therapy, POC, and safety during ADLs and functional mobility. Pt and OT discussed importance of safe, continued mobility to optimize daily living skills. Pt verbalized understanding.   Pt completed the following during session: bed mobility, sit<stand t/f, bedroom mobility, setup with ADLs, education related to current cardiovascular needs and pursed lip breathing edu. White board updated during session. Pt given instruction to call for medical staff/nurse for assistance.       Patient left up in chair with all lines intact, call button in reach, RN Evangelist notified and Dr. Hdez presentEducation:      GOALS:   Multidisciplinary Problems     Occupational Therapy Goals        Problem: Occupational Therapy Goal    Goal Priority  Disciplines Outcome Interventions   Occupational Therapy Goal     OT, PT/OT Ongoing, Progressing    Description: Goals to be met by: 9/7/2020     Patient will increase functional independence with ADLs by performing:    UE Dressing with [place].  LE Dressing with Minimal Assistance.  Grooming while standing with Stand-by Assistance.  Toileting from toilet with Minimal Assistance for hygiene and clothing management.   Toilet transfer to toilet with Stand-by Assistance.                     Time Tracking:     OT Date of Treatment: 08/27/20  OT Start Time: 1022  OT Stop Time: 1046  OT Total Time (min): 24 min    Billable Minutes:Self Care/Home Management 24 min    Yusra De La Cruz OT  8/27/2020

## 2020-08-28 ENCOUNTER — NURSE TRIAGE (OUTPATIENT)
Dept: ADMINISTRATIVE | Facility: CLINIC | Age: 85
End: 2020-08-28

## 2020-08-28 VITALS
OXYGEN SATURATION: 91 % | SYSTOLIC BLOOD PRESSURE: 118 MMHG | DIASTOLIC BLOOD PRESSURE: 67 MMHG | BODY MASS INDEX: 32.51 KG/M2 | TEMPERATURE: 98 F | HEIGHT: 67 IN | HEART RATE: 80 BPM | WEIGHT: 207.13 LBS | RESPIRATION RATE: 23 BRPM

## 2020-08-28 PROBLEM — J96.01 ACUTE HYPOXEMIC RESPIRATORY FAILURE: Status: RESOLVED | Noted: 2020-08-22 | Resolved: 2020-08-28

## 2020-08-28 PROBLEM — R79.89 ELEVATED TROPONIN: Status: RESOLVED | Noted: 2020-08-22 | Resolved: 2020-08-28

## 2020-08-28 PROBLEM — J12.82 PNEUMONIA DUE TO COVID-19 VIRUS: Status: RESOLVED | Noted: 2020-08-22 | Resolved: 2020-08-28

## 2020-08-28 PROBLEM — U07.1 PNEUMONIA DUE TO COVID-19 VIRUS: Status: RESOLVED | Noted: 2020-08-22 | Resolved: 2020-08-28

## 2020-08-28 PROCEDURE — 99239 PR HOSPITAL DISCHARGE DAY,>30 MIN: ICD-10-PCS | Mod: ,,, | Performed by: INTERNAL MEDICINE

## 2020-08-28 PROCEDURE — 94640 AIRWAY INHALATION TREATMENT: CPT

## 2020-08-28 PROCEDURE — 25000003 PHARM REV CODE 250: Performed by: INTERNAL MEDICINE

## 2020-08-28 PROCEDURE — 25000003 PHARM REV CODE 250: Performed by: HOSPITALIST

## 2020-08-28 PROCEDURE — 99239 HOSP IP/OBS DSCHRG MGMT >30: CPT | Mod: ,,, | Performed by: INTERNAL MEDICINE

## 2020-08-28 PROCEDURE — 94761 N-INVAS EAR/PLS OXIMETRY MLT: CPT

## 2020-08-28 PROCEDURE — 63600175 PHARM REV CODE 636 W HCPCS: Performed by: HOSPITALIST

## 2020-08-28 PROCEDURE — 94799 UNLISTED PULMONARY SVC/PX: CPT

## 2020-08-28 PROCEDURE — 27000221 HC OXYGEN, UP TO 24 HOURS

## 2020-08-28 PROCEDURE — 99900035 HC TECH TIME PER 15 MIN (STAT)

## 2020-08-28 RX ORDER — ASCORBIC ACID 500 MG
500 TABLET ORAL 2 TIMES DAILY
Qty: 60 TABLET | Refills: 11 | Status: SHIPPED | OUTPATIENT
Start: 2020-08-28

## 2020-08-28 RX ORDER — LOSARTAN POTASSIUM 50 MG/1
50 TABLET ORAL 2 TIMES DAILY
Qty: 60 TABLET | Refills: 11 | Status: SHIPPED | OUTPATIENT
Start: 2020-08-28 | End: 2021-08-28

## 2020-08-28 RX ORDER — ALBUTEROL SULFATE 90 UG/1
AEROSOL, METERED RESPIRATORY (INHALATION)
Qty: 18 G | Refills: 2 | Status: SHIPPED | OUTPATIENT
Start: 2020-08-28 | End: 2020-11-03 | Stop reason: CLARIF

## 2020-08-28 RX ORDER — HYDRALAZINE HYDROCHLORIDE 50 MG/1
50 TABLET, FILM COATED ORAL EVERY 12 HOURS
Qty: 60 TABLET | Refills: 11 | Status: SHIPPED | OUTPATIENT
Start: 2020-08-28 | End: 2020-11-03 | Stop reason: CLARIF

## 2020-08-28 RX ORDER — HYDRALAZINE HYDROCHLORIDE 50 MG/1
50 TABLET, FILM COATED ORAL ONCE
Status: DISCONTINUED | OUTPATIENT
Start: 2020-08-28 | End: 2020-08-28 | Stop reason: HOSPADM

## 2020-08-28 RX ORDER — FUROSEMIDE 20 MG/1
20 TABLET ORAL EVERY MORNING
Qty: 30 TABLET | Refills: 11 | Status: SHIPPED | OUTPATIENT
Start: 2020-08-28

## 2020-08-28 RX ORDER — HYDRALAZINE HYDROCHLORIDE 50 MG/1
100 TABLET, FILM COATED ORAL EVERY 12 HOURS
Status: DISCONTINUED | OUTPATIENT
Start: 2020-08-28 | End: 2020-08-28 | Stop reason: HOSPADM

## 2020-08-28 RX ADMIN — PANTOPRAZOLE SODIUM 40 MG: 40 TABLET, DELAYED RELEASE ORAL at 09:08

## 2020-08-28 RX ADMIN — Medication 500 MG: at 09:08

## 2020-08-28 RX ADMIN — ASPIRIN 81 MG: 81 TABLET, COATED ORAL at 09:08

## 2020-08-28 RX ADMIN — ALBUTEROL SULFATE 2 PUFF: 90 AEROSOL, METERED RESPIRATORY (INHALATION) at 08:08

## 2020-08-28 RX ADMIN — HYDRALAZINE HYDROCHLORIDE 50 MG: 50 TABLET, FILM COATED ORAL at 05:08

## 2020-08-28 RX ADMIN — FINASTERIDE 5 MG: 5 TABLET, FILM COATED ORAL at 09:08

## 2020-08-28 RX ADMIN — DEXAMETHASONE 6 MG: 4 TABLET ORAL at 09:08

## 2020-08-28 RX ADMIN — LOSARTAN POTASSIUM 50 MG: 25 TABLET, FILM COATED ORAL at 09:08

## 2020-08-28 RX ADMIN — CHOLECALCIFEROL (VITAMIN D3) 25 MCG (1,000 UNIT) TABLET 1000 UNITS: at 09:08

## 2020-08-28 RX ADMIN — TAMSULOSIN HYDROCHLORIDE 0.4 MG: 0.4 CAPSULE ORAL at 09:08

## 2020-08-28 RX ADMIN — THERA TABS 1 TABLET: TAB at 09:08

## 2020-08-28 NOTE — PLAN OF CARE
[facility] - [facility]    HOME HEALTH ORDERS  FACE TO FACE ENCOUNTER    Patient Name: Pinky Rivera  YOB: 1930    PCP: Umair Cevallos MD   PCP Address: [address] / [place] [address]  PCP Phone Number: [phone]  PCP Fax: [phone]    Encounter Date: 08/28/2020    Admit to Home Health    Diagnoses:  Active Hospital Problems    Diagnosis  POA    Palliative care encounter [Z51.5]  Not Applicable    BPH (benign prostatic hyperplasia) [N40.0]  Yes    CKD (chronic kidney disease), stage III [N18.3]  Yes    Blindness of right eye [H54.40]  Yes     Chronic    Essential hypertension [I10]  Yes      Resolved Hospital Problems    Diagnosis Date Resolved POA    *Pneumonia due to COVID-19 virus [U07.1, J12.89] 08/28/2020 Yes    Acute hypoxemic respiratory failure [J96.01] 08/28/2020 Yes    Elevated troponin [R79.89] 08/28/2020 Yes       No future appointments.        I have seen and examined this patient face to face today. My clinical findings that support the need for the home health skilled services and home bound status are the following:  Weakness/numbness causing balance and gait disturbance due to Infection, Weakness/Debility and Dehydration making it taxing to leave home.    Allergies:Review of patient's allergies indicates:  No Known Allergies    Diet: regular diet    Activities: activity as tolerated    Nursing:   SN to complete comprehensive assessment including routine vital signs. Instruct on disease process and s/s of complications to report to MD. Review/verify medication list sent home with the patient at time of discharge  and instruct patient/caregiver as needed. Frequency may be adjusted depending on start of care date.    Notify MD if SBP > 160 or < 90; DBP > 90 or < 50; HR > 120 or < 50; Temp > 101      CONSULTS:    Physical Therapy to evaluate and treat. Evaluate for home safety and equipment needs; Establish/upgrade home exercise  program. Perform / instruct on therapeutic exercises, gait training, transfer training, and Range of Motion.  Occupational Therapy to evaluate and treat. Evaluate home environment for safety and equipment needs. Perform/Instruct on transfers, ADL training, ROM, and therapeutic exercises.    MISCELLANEOUS CARE:  N/A    WOUND CARE ORDERS  n/a      Medications: Review discharge medications with patient and family and provide education.      Current Discharge Medication List      START taking these medications    Details   ascorbic acid, vitamin C, (VITAMIN C) 500 MG tablet Take 1 tablet (500 mg total) by mouth 2 (two) times daily.  Qty: 60 tablet, Refills: 11      hydrALAZINE (APRESOLINE) 50 MG tablet Take 1 tablet (50 mg total) by mouth every 12 (twelve) hours.  Qty: 60 tablet, Refills: 11      multivitamin Tab Take 1 tablet by mouth once daily.  Qty: 30 tablet, Refills: 11         CONTINUE these medications which have CHANGED    Details   albuterol 2 mg/5 mL syrup Take 5 mLs (2 mg total) by mouth 3 (three) times daily as needed (COUGH/BRONCHOSPASM).  Qty: 100 mL, Refills: 2      furosemide (LASIX) 20 MG tablet Take 1 tablet (20 mg total) by mouth every morning.  Qty: 30 tablet, Refills: 11      losartan (COZAAR) 50 MG tablet Take 1 tablet (50 mg total) by mouth 2 (two) times a day.  Qty: 60 tablet, Refills: 11         CONTINUE these medications which have NOT CHANGED    Details   aspirin (ECOTRIN) 81 MG EC tablet Take 81 mg by mouth every morning.       finasteride (PROSCAR) 5 mg tablet Take 5 mg by mouth daily 2 hours after breakfast.      naproxen sodium (ANAPROX) 220 MG tablet Take 1 tablet (220 mg total) by mouth as needed (hematuria).      TAMSULOSIN HCL (FLOMAX ORAL) Take 0.4 mg by mouth daily 2 hours after breakfast.      vitamin D 1000 units Tab Take 1 tablet (1,000 Units total) by mouth nightly.             I certify that this patient is confined to his home and needs intermittent skilled nursing care,  physical therapy and occupational therapy.

## 2020-08-28 NOTE — PLAN OF CARE
Pt O2 at 1 L all night, sats above 95, pt slept in chair instead of bed (pre opt request). Stated hydralazine, pt b/p stabilized. Input of 100ml, and output of 500ml, call light ion reach.

## 2020-08-28 NOTE — TELEPHONE ENCOUNTER
Reason for Disposition   [1] COVID-19 infection diagnosed or suspected AND [2] mild symptoms (fever, cough) AND [3] no trouble breathing or other complications   COVID-19 Home Isolation, questions about   COVID-19 Testing, questions about    Additional Information   Negative: Severe difficulty breathing (e.g., struggling for each breath, speaks in single words)   Negative: Difficult to awaken or acting confused (e.g., disoriented, slurred speech)   Negative: Bluish (or gray) lips or face now   Negative: Shock suspected (e.g., cold/pale/clammy skin, too weak to stand, low BP, rapid pulse)   Negative: Sounds like a life-threatening emergency to the triager   Negative: [1] COVID-19 suspected (e.g., cough, fever, shortness of breath) AND [2] mild symptoms AND [3] public health department recommends testing   Negative: [1] COVID-19 exposure AND [2] no symptoms   Negative: COVID-19 and Breastfeeding, questions about   Negative: SEVERE or constant chest pain (Exception: mild central chest pain, present only when coughing)   Negative: MODERATE difficulty breathing (e.g., speaks in phrases, SOB even at rest, pulse 100-120)   Negative: Patient sounds very sick or weak to the triager   Negative: MILD difficulty breathing (e.g., minimal/no SOB at rest, SOB with walking, pulse <100)   Negative: Chest pain   Negative: Fever > 103 F (39.4 C)   Negative: [1] Fever > 101 F (38.3 C) AND [2] age > 60   Negative: [1] Fever > 100.0 F (37.8 C) AND [2] bedridden (e.g., nursing home patient, CVA, chronic illness, recovering from surgery)   Negative: HIGH RISK patient (e.g., age > 64 years, diabetes, heart or lung disease, weak immune system)   Negative: Fever present > 3 days (72 hours)   Negative: [1] Fever returns after gone for over 24 hours AND [2] symptoms worse or not improved   Negative: [1] Continuous (nonstop) coughing interferes with work or school AND [2] no improvement using cough treatment per  protocol   Negative: Cough present > 3 weeks    Protocols used: CORONAVIRUS (COVID-19) - DIAGNOSED OR [id number]

## 2020-08-28 NOTE — DISCHARGE SUMMARY
Discharge Summary  Hospital Medicine  [facility]      Attending Physician on Discharge: Miles Hdez MD  Hospital Medicine Team: [facility] HOSP MED G  Date of Admission:  8/22/2020     Date of Discharge:      Active Hospital Problems    Diagnosis  POA    Palliative care encounter [Z51.5]  Not Applicable    BPH (benign prostatic hyperplasia) [N40.0]  Yes    CKD (chronic kidney disease), stage III [N18.3]  Yes    Blindness of right eye [H54.40]  Yes     Chronic    Essential hypertension [I10]  Yes      Resolved Hospital Problems    Diagnosis Date Resolved POA    *Pneumonia due to COVID-19 virus [U07.1, J12.89] 08/28/2020 Yes    Acute hypoxemic respiratory failure [J96.01] 08/28/2020 Yes    Elevated troponin [R79.89] 08/28/2020 Yes       Consults: Palliative Care     History of Present Illness:    Mr. Rivera is a 89 year old male with PMH of HTN, CKD III, BPH, chronic venous stasis of lower extremities, OA of knees, R eye blindness who presents as a transfer from [facility] ED for management of COVID pneumonia and associated hypoxia. Patient presented to ED last evening for evaluation of non productive cough for 1 month and progressive weakness over last 3-4 days. Family got concerned about his weakness and poor PO intake which prompted them to bring him to hospital. He was hypoxic with O2 sat 86% on room air upon arrival to ED which improved to 96% on 4L supplemental oxygen. He tested positive for COVID along with elevated inflammatory markers and D-dimer. CTA negative for PE but showed bilateral patchy ground glass opacities consistent with viral pneumonia. He received  cc bolus, dexamethasone, empiric dose of CTX and azithromycin prior to transfer. Blood cultures sent.      Patient denies fever, chills, headache, sore throat, chest pain, sob, palpitation, N/V/D, abdominal pain or urinary complaints. Patient states he does not go outside the house and not sure how he contracted  the corona virus. He denies recent travel or sick contact. He is hard of hearing but appears comfortable and offers no complaints during my interview.     Hospital Course:     Pinky Rivera was admitted to Hospital Medicine for treatment of suspected COVID-19 viral infection and was treated with supportive care following a comprehensive physical, radiographic, and lab evaluation tailored to the current standard of care for COVID19. Please review the admission H&P and the studies listed below for details. He improved with supportive care and was found to be suitable for discharge home with oxygen.    On the day of discharge, isolation precautions were reviewed at length verbally. The patient was also provided with written isolation guidelines modified from the [place] Department of Health and [facility] as well as the CDC, as part of discharge paperwork. An updated phone number was obtained, which will be used to contact the patient when results are available, and positive patients will be enrolled in both the COVID-19 Home Symptom Monitoring program.    COVID-19 Virus Infection  Viral Pneumonia due to COVID-19  - COVID-19 testing: Positive on 8/21/20  - Isolation: Airborne/Droplet.   - Diagnostics: >blood cultures, flu swab, strep A screen negative   -D-dimer elevated, CRP downtrending/CK downtrending, LDH stable  -dexamethasone   -Remdesivir completed   -supplements - ascorbic acid_vitamin D_MVI  -empiric abx - CTX/Azithro completed 5 day course.   -Change Lab frequency to every 48hrs.      Acute Hypoxemic Respiratory Failure- improving   -continue dexamethasone 6 mg daily   -Patient initially 10L High flow after exertion and weaned to 3L NC  - CTA Chest obtained and negative for PE, Empiric anticoagulation given for 24hrs and will be tapered back to DVT prophylaxis dosing.   - continuing nebulizers.    -PPI while on steroid   -pharmacy consult for Remdesivir   - discharged on home oxygen      # HTN    -Losartan dosing to 50mg PO BID.   - resumed lasix upon discharge     #CKD III   -monitor closely and avoid nephrotoxic agents   -Cr in normal range.      #BPH  -no acute issue   -continue home dose flomax and proscar      #Chronic venous stasis of legs   -no edema on exam   -resumed home lasix upon discharge   - feet cold to touch, DP pulses are dopplerable      # mild elevated troponin   -troponin 0.09 due to COVID and in setting of CKD   -no chest pain and EKG w/o ischemia   -trend shows flat to downtrending.   -continue aspirin      Constipation-slow transit  -started routine bowel regimen/senna-docusate, miralax  -s/p lactulose x 1  -Resolved with above regimen    Laboratory Values:  Lab Results   Component Value Date    [id number] Positive (A) 08/21/2020       Recent Labs   Lab 08/25/20  0426 08/26/20  0332 08/27/20  0915   WBC 7.71 8.52 8.75   LYMPH 4.2*  0.3* 4.1*  0.4* 4.7*  0.4*   HGB 14.6 14.0 15.0   HCT 47.4 45.0 47.2    248 257     Recent Labs   Lab 08/25/20  0425 08/26/20  0332 08/27/20  0915    137 138   K 4.8 4.6 4.8    103 104   CO2 27 27 28   BUN 27* 24* 24*   CREATININE 0.9 1.1 1.0   * 113* 103   CALCIUM 8.3* 8.4* 8.7   MG 2.3 2.2 2.1   PHOS 2.8 2.8 2.6*     Recent Labs   Lab 08/21/20  1905  08/25/20  0425 08/26/20  0332 08/27/20  0915   ALKPHOS 53*   < > 62 59 64   ALT 13   < > 37 30 26   AST 30   < > 45* 31 21   ALBUMIN 3.3*   < > 2.9* 2.7* 2.7*   PROT 7.3   < > 6.5 6.2 6.2   BILITOT 0.5   < > 0.5 0.4 0.6   INR 1.0  --   --   --   --     < > = values in this interval not displayed.        Recent Labs     08/25/20  0946 08/27/20  0915   DDIMER  --  5.45*   FERRITIN  --  391*   CRP  --  29.6*   LDH  --  329*   BNP 58  --    TROPONINI 0.032*  --    CPK  --  40         Microbiology:  Microbiology Results (last 7 days)     ** No results found for the last 168 hours. **          Imaging:      Cardiac:      No results found for this or any previous  visit.      Procedures:   * No surgery found *        Current Discharge Medication List      START taking these medications    Details   ascorbic acid, vitamin C, (VITAMIN C) 500 MG tablet Take 1 tablet (500 mg total) by mouth 2 (two) times daily.  Qty: 60 tablet, Refills: 11      hydrALAZINE (APRESOLINE) 50 MG tablet Take 1 tablet (50 mg total) by mouth every 12 (twelve) hours.  Qty: 60 tablet, Refills: 11      multivitamin Tab Take 1 tablet by mouth once daily.  Qty: 30 tablet, Refills: 11         CONTINUE these medications which have CHANGED    Details   albuterol 2 mg/5 mL syrup Take 5 mLs (2 mg total) by mouth 3 (three) times daily as needed (COUGH/BRONCHOSPASM).  Qty: 100 mL, Refills: 2      furosemide (LASIX) 20 MG tablet Take 1 tablet (20 mg total) by mouth every morning.  Qty: 30 tablet, Refills: 11      losartan (COZAAR) 50 MG tablet Take 1 tablet (50 mg total) by mouth 2 (two) times a day.  Qty: 60 tablet, Refills: 11         CONTINUE these medications which have NOT CHANGED    Details   aspirin (ECOTRIN) 81 MG EC tablet Take 81 mg by mouth every morning.       finasteride (PROSCAR) 5 mg tablet Take 5 mg by mouth daily 2 hours after breakfast.      naproxen sodium (ANAPROX) 220 MG tablet Take 1 tablet (220 mg total) by mouth as needed (hematuria).      TAMSULOSIN HCL (FLOMAX ORAL) Take 0.4 mg by mouth daily 2 hours after breakfast.      vitamin D 1000 units Tab Take 1 tablet (1,000 Units total) by mouth nightly.               Discharge Diet:regular diet with Normal Fluid intake of 1500 - 2000 mL per day    Activity: activity as tolerated    Discharge Condition: Good    Disposition: Home-Health Care [facility]    Follow up:  PCP      Tests pending at the time of discharge: none        Time spent  on the discharge of the patient including review of hospital course with the patient. reviewing discharge medications and arranging follow-up care: 45 mins    Discharge examination: Patient was seen and examined on the  date of discharge and determined to be suitable for discharge.      Miles Hdez MD  Hospital Medicine Staff  Pager: [phone]; Spectra: [phone]

## 2020-08-28 NOTE — TELEPHONE ENCOUNTER
Sp02: 93    Pulse: 69    Temperature: Afebrile    SOB at rest (0-5): 0    SOB with activity (0-5): 0    Cough: Loose cough    Is cough worsening?: no    Fever symptoms: no    Increased home oxygen?: no  Patient on 3l/nc

## 2020-08-28 NOTE — PROGRESS NOTES
Home Oxygen Evaluation    Date Performed: 8/28/2020    1) Patient's Home O2 Sat on room air, while at rest: 85%        If O2 sats on room air at rest are 88% or below, patient qualifies. No additional testing needed. Document N/A in steps 2 and 3. If 89% or above, complete steps 2.      2) Patient's O2 Sat on room air while exercising: n/a        If O2 sats on room air while exercising remain 89% or above patient does not qualify, no further testing needed Document N/A in step 3. If O2 sats on room air while exercising are 88% or below, continue to step 3.      3) Patient's O2 Sat while exercising on O2: n/a at n/a LPM         (Must show improvement from #2 for patients to qualify)    If O2 sats improve on oxygen, patient qualifies for portable oxygen. If not, the patient does not qualify.

## 2020-08-28 NOTE — PT/OT/SLP PROGRESS
Occupational Therapy      Patient Name:  Pinky Rivera   MRN:  [id number]    Patient not seen today secondary to Other (Comment)(Pt with d/c orders in place). Will follow-up if pt remains hospitalized.    Yusra De La Cruz OT  8/28/2020

## 2020-08-28 NOTE — TELEPHONE ENCOUNTER
First contact  Contacted patient's daughter Chloé Marion ([phone]) on behalf of [facility] Surveillance program enrollment and orientation process.  Daughter stated patient does not have and can't use a smartphone.  Daughter stated patient lives out in [place] with very poor phone service.  Daughter stated patient has been doing better since out of hospital.  Patient is on 3l/n and O2 sat is 93% with HR of 69.  Patient with loose cough and uses inhaler and Incentive Spirometer.  Daughter denied fever, pain or SOB.  Offered [employer] texting program and daughter stated we can send the messages to her phone.  Phone number entered in portal signup  Provided daughter with OOC number for non-emergent needs and 911 for emergencies

## 2020-08-31 ENCOUNTER — PATIENT OUTREACH (OUTPATIENT)
Dept: ADMINISTRATIVE | Facility: CLINIC | Age: 85
End: 2020-08-31

## 2020-08-31 NOTE — PROGRESS NOTES
C3 nurse attempted to contact patient. The following occurred:   C3 nurse attempted to contact Pinky Rivera ( daughter)  for a TCC post hospital discharge follow up call. The patient is unable to conduct the call @ this time. The patient requested a callback.    The patient does not have a scheduled HOSFU appointment within 7-14 days post hospital discharge date 08/28/2020. Message sent to Physician staff to assist with HOSFU appointment scheduling.

## 2020-08-31 NOTE — PROGRESS NOTES
Please forward this important TCC information to your provider in order to maximize the post discharge care delivery of this patient.    C3 nurse spoke with Pinky Rivera ( daughter)  for a TCC post hospital discharge follow up call. The patient does not have a scheduled HOSFU appointment with Umair Cevallos MD  within 7-14 days post hospital discharge date 08/28/2020. C3 nurse was unable to schedule HOSFU appointment in [facility].  Please contact pcp  and schedule follow up appointment using HOSFU visit type on or before 09/12/2020    Respectfully,  Kelly Jeronimo LPN    [facility]    [email or web address]       Please do not reply to this message, as this inbox is not routinely monitored.

## 2020-08-31 NOTE — PATIENT INSTRUCTIONS
Pneumonia (Adult)  Pneumonia is an infection deep within the lungs. It is in the small air sacs (alveoli). Pneumonia may be caused by a virus or bacteria. Pneumonia caused by bacteria is usually treated with an antibiotic. Severe cases may need to be treated in the hospital. Milder cases can be treated at home. Symptoms usually start to get better during the first 2 days of treatment.    Home care  Follow these guidelines when caring for yourself at home:  · Rest at home for the first 2 to 3 days, or until you feel stronger. Dont let yourself get overly tired when you go back to your activities.  · Stay away from cigarette smoke - yours or other peoples.  · You may use acetaminophen or ibuprofen to control fever or pain, unless another medicine was prescribed. If you have chronic liver or kidney disease, talk with your healthcare provider before using these medicines. Also talk with your provider if youve had a stomach ulcer or gastrointestinal bleeding. Dont give aspirin to anyone younger than 18 years of age who is ill with a fever. It may cause severe liver damage.  · Your appetite may be poor, so a light diet is fine.  · Drink 6 to 8 glasses of fluids every day to make sure you are getting enough fluids. Beverages can include water, sport drinks, sodas without caffeine, juices, tea, or soup. Fluids will help loosen secretions in the lung. This will make it easier for you to cough up the phlegm (sputum). If you also have heart or kidney disease, check with your healthcare provider before you drink extra fluids.  · Take antibiotic medicine prescribed until it is all gone, even if you are feeling better after a few days.  Follow-up care  Follow up with your healthcare provider in the next 2 to 3 days, or as advised. This is to be sure the medicine is helping you get better.  If you are 65 or older, you should get a pneumococcal vaccine and a yearly flu (influenza) shot. You should also get these vaccines if  you have chronic lung disease like asthma, emphysema, or COPD. Recently, a second type of pneumonia vaccine has become available for everyone over 65 years old. This is in addition to the previous vaccine. Ask your provider about this.  When to seek medical advice  Call your healthcare provider right away if any of these occur:  · You dont get better within the first 48 hours of treatment  · Shortness of breath gets worse  · Rapid breathing (more than 25 breaths per minute)  · Coughing up blood  · Chest pain gets worse with breathing  · Fever of 100.4°F (38°C) or higher that doesnt get better with fever medicine  · Weakness, dizziness, or fainting that gets worse  · Thirst or dry mouth that gets worse  · Sinus pain, headache, or a stiff neck  · Chest pain not caused by coughing  Date Last Reviewed: 1/1/2017  © 9208-2414 [employer] [address] [place] [address]. All rights reserved. This information is not intended as a substitute for professional medical care. Always follow your healthcare professional's instructions.

## 2020-08-31 NOTE — PLAN OF CARE
08/31/20 0851   Final Note   Assessment Type Final Discharge Note   Anticipated Discharge Disposition Home   Hospital Follow Up  Appt(s) scheduled? Yes   Discharge plans and expectations educations in teach back method with documentation complete? Yes   Right Care Referral Info   Post Acute Recommendation Other   Referral Type home health with home o2   Post-Acute Status   Post-Acute Authorization Home Health   Discharge Delays None known at this time

## 2021-10-19 ENCOUNTER — LAB VISIT (OUTPATIENT)
Dept: LAB | Facility: HOSPITAL | Age: 86
End: 2021-10-19
Attending: GENERAL PRACTICE
Payer: MEDICARE

## 2021-10-19 DIAGNOSIS — I50.9 HEART FAILURE, UNSPECIFIED: Primary | ICD-10-CM

## 2021-10-19 LAB
ANION GAP SERPL CALC-SCNC: 10 MMOL/L (ref 8–16)
BUN SERPL-MCNC: 30 MG/DL (ref 10–30)
CALCIUM SERPL-MCNC: 9.9 MG/DL (ref 8.7–10.5)
CHLORIDE SERPL-SCNC: 106 MMOL/L (ref 95–110)
CO2 SERPL-SCNC: 26 MMOL/L (ref 23–29)
CREAT SERPL-MCNC: 1.4 MG/DL (ref 0.5–1.4)
EST. GFR  (AFRICAN AMERICAN): 50 ML/MIN/1.73 M^2
EST. GFR  (NON AFRICAN AMERICAN): 44 ML/MIN/1.73 M^2
GLUCOSE SERPL-MCNC: 87 MG/DL (ref 70–110)
POTASSIUM SERPL-SCNC: 4.5 MMOL/L (ref 3.5–5.1)
SODIUM SERPL-SCNC: 142 MMOL/L (ref 136–145)

## 2021-10-19 PROCEDURE — 80048 BASIC METABOLIC PNL TOTAL CA: CPT

## 2021-12-17 ENCOUNTER — LAB VISIT (OUTPATIENT)
Dept: LAB | Facility: HOSPITAL | Age: 86
End: 2021-12-17
Attending: GENERAL PRACTICE
Payer: MEDICARE

## 2021-12-17 DIAGNOSIS — I50.9 CHF (CONGESTIVE HEART FAILURE): Primary | ICD-10-CM

## 2021-12-17 LAB
ANION GAP SERPL CALC-SCNC: 12 MMOL/L (ref 8–16)
BUN SERPL-MCNC: 32 MG/DL (ref 10–30)
CALCIUM SERPL-MCNC: 9.2 MG/DL (ref 8.7–10.5)
CHLORIDE SERPL-SCNC: 106 MMOL/L (ref 95–110)
CO2 SERPL-SCNC: 23 MMOL/L (ref 23–29)
CREAT SERPL-MCNC: 1.4 MG/DL (ref 0.5–1.4)
EST. GFR  (AFRICAN AMERICAN): 50 ML/MIN/1.73 M^2
EST. GFR  (NON AFRICAN AMERICAN): 44 ML/MIN/1.73 M^2
GLUCOSE SERPL-MCNC: 95 MG/DL (ref 70–110)
POTASSIUM SERPL-SCNC: 4.6 MMOL/L (ref 3.5–5.1)
SODIUM SERPL-SCNC: 141 MMOL/L (ref 136–145)

## 2021-12-17 PROCEDURE — 80048 BASIC METABOLIC PNL TOTAL CA: CPT

## 2024-02-22 ENCOUNTER — HOSPITAL ENCOUNTER (OUTPATIENT)
Dept: RADIOLOGY | Facility: HOSPITAL | Age: 89
Discharge: HOME | End: 2024-02-22
Attending: GENERAL PRACTICE
Payer: MEDICARE

## 2024-02-22 DIAGNOSIS — R31.9 BLOOD IN URINE: ICD-10-CM

## 2024-02-22 PROCEDURE — 74176 CT ABD & PELVIS W/O CONTRAST: CPT | Mod: 26,,, | Performed by: RADIOLOGY

## 2024-02-22 PROCEDURE — 74176 CT ABD & PELVIS W/O CONTRAST: CPT | Mod: TC

## 2025-08-12 ENCOUNTER — TELEPHONE (OUTPATIENT)
Dept: INTERNAL MEDICINE | Facility: CLINIC | Age: OVER 89
End: 2025-08-12
Payer: MEDICARE

## 2025-08-12 ENCOUNTER — LAB VISIT (OUTPATIENT)
Dept: LAB | Facility: HOSPITAL | Age: OVER 89
End: 2025-08-12
Attending: NURSE PRACTITIONER
Payer: MEDICARE

## 2025-08-12 DIAGNOSIS — R41.0 CONFUSION: Primary | ICD-10-CM

## 2025-08-12 DIAGNOSIS — R41.0 CONFUSION: ICD-10-CM

## 2025-08-12 LAB
BACTERIA #/AREA URNS HPF: ABNORMAL /HPF
BILIRUB UR QL STRIP.AUTO: ABNORMAL
CLARITY UR: ABNORMAL
COLOR UR AUTO: YELLOW
GLUCOSE UR QL STRIP: NEGATIVE
HGB UR QL STRIP: ABNORMAL
HYALINE CASTS #/AREA URNS LPF: 12 /LPF (ref 0–1)
KETONES UR QL STRIP: NEGATIVE
LEUKOCYTE ESTERASE UR QL STRIP: ABNORMAL
MICROSCOPIC COMMENT: ABNORMAL
NITRITE UR QL STRIP: NEGATIVE
PH UR STRIP: >8 [PH]
PROT UR QL STRIP: ABNORMAL
RBC #/AREA URNS HPF: 10 /HPF (ref 0–4)
SP GR UR STRIP: 1.01
TRI-PHOS CRY URNS QL MICRO: ABNORMAL
UROBILINOGEN UR STRIP-ACNC: 1 EU/DL
WBC #/AREA URNS HPF: >100 /HPF (ref 0–5)

## 2025-08-12 PROCEDURE — 81003 URINALYSIS AUTO W/O SCOPE: CPT

## 2025-08-12 PROCEDURE — 87086 URINE CULTURE/COLONY COUNT: CPT

## 2025-08-12 RX ORDER — CIPROFLOXACIN 500 MG/1
500 TABLET, FILM COATED ORAL EVERY 12 HOURS
Qty: 14 TABLET | Refills: 0 | Status: SHIPPED | OUTPATIENT
Start: 2025-08-12 | End: 2025-08-19

## 2025-08-14 ENCOUNTER — OFFICE VISIT (OUTPATIENT)
Dept: INTERNAL MEDICINE | Facility: CLINIC | Age: OVER 89
End: 2025-08-14
Payer: MEDICARE

## 2025-08-14 DIAGNOSIS — R29.6 FREQUENT FALLS: Primary | ICD-10-CM

## 2025-08-14 DIAGNOSIS — T14.8XXA MULTIPLE SKIN TEARS: ICD-10-CM

## 2025-08-14 DIAGNOSIS — R41.89 COGNITIVE CHANGES: ICD-10-CM

## 2025-08-14 LAB — BACTERIA UR CULT: NORMAL

## 2025-08-14 PROCEDURE — 98001 SYNCH AUDIO-VIDEO NEW LOW 30: CPT | Mod: 95,,, | Performed by: NURSE PRACTITIONER

## 2025-08-17 ENCOUNTER — TELEPHONE (OUTPATIENT)
Dept: INTERNAL MEDICINE | Facility: CLINIC | Age: OVER 89
End: 2025-08-17
Payer: MEDICARE

## 2025-08-17 DIAGNOSIS — I50.42 CHRONIC COMBINED SYSTOLIC AND DIASTOLIC CONGESTIVE HEART FAILURE: ICD-10-CM

## 2025-08-17 DIAGNOSIS — R05.9 COUGH, UNSPECIFIED TYPE: ICD-10-CM

## 2025-08-17 DIAGNOSIS — F41.9 ANXIETY: ICD-10-CM

## 2025-08-17 DIAGNOSIS — R68.89 COPIOUS ORAL SECRETIONS: ICD-10-CM

## 2025-08-17 DIAGNOSIS — F41.9 ANXIETY: Primary | ICD-10-CM

## 2025-08-17 DIAGNOSIS — R45.1 AGITATION: Primary | ICD-10-CM

## 2025-08-17 DIAGNOSIS — R68.89 COPIOUS ORAL SECRETIONS: Primary | ICD-10-CM

## 2025-08-17 RX ORDER — SCOPOLAMINE 1 MG/3D
1 PATCH, EXTENDED RELEASE TRANSDERMAL
Qty: 20 PATCH | Refills: 0 | Status: SHIPPED | OUTPATIENT
Start: 2025-08-17 | End: 2025-08-17 | Stop reason: SDUPTHER

## 2025-08-17 RX ORDER — LORAZEPAM 0.5 MG/1
0.5 TABLET ORAL EVERY 6 HOURS PRN
Qty: 30 TABLET | Refills: 2 | Status: SHIPPED | OUTPATIENT
Start: 2025-08-17 | End: 2025-08-17 | Stop reason: SDUPTHER

## 2025-08-17 RX ORDER — SCOPOLAMINE 1 MG/3D
1 PATCH, EXTENDED RELEASE TRANSDERMAL
Qty: 20 PATCH | Refills: 0 | Status: SHIPPED | OUTPATIENT
Start: 2025-08-17

## 2025-08-17 RX ORDER — LORAZEPAM 0.5 MG/1
0.5 TABLET ORAL EVERY 6 HOURS PRN
Qty: 30 TABLET | Refills: 2 | Status: SHIPPED | OUTPATIENT
Start: 2025-08-17 | End: 2025-09-16